# Patient Record
Sex: MALE | Race: WHITE | NOT HISPANIC OR LATINO | Employment: OTHER | URBAN - METROPOLITAN AREA
[De-identification: names, ages, dates, MRNs, and addresses within clinical notes are randomized per-mention and may not be internally consistent; named-entity substitution may affect disease eponyms.]

---

## 2017-01-20 ENCOUNTER — PROCEDURE VISIT (OUTPATIENT)
Dept: CARDIOLOGY | Facility: HOSPITAL | Age: 62
End: 2017-01-20

## 2017-01-20 ENCOUNTER — OFFICE VISIT (OUTPATIENT)
Dept: CARDIOLOGY | Facility: HOSPITAL | Age: 62
End: 2017-01-20

## 2017-01-20 VITALS
BODY MASS INDEX: 25.95 KG/M2 | WEIGHT: 195.8 LBS | TEMPERATURE: 98.9 F | SYSTOLIC BLOOD PRESSURE: 140 MMHG | HEART RATE: 93 BPM | DIASTOLIC BLOOD PRESSURE: 85 MMHG | RESPIRATION RATE: 20 BRPM | HEIGHT: 73 IN | OXYGEN SATURATION: 99 %

## 2017-01-20 DIAGNOSIS — I48.0 PAROXYSMAL ATRIAL FIBRILLATION (HCC): Primary | ICD-10-CM

## 2017-01-20 DIAGNOSIS — I48.0 PAROXYSMAL ATRIAL FIBRILLATION (HCC): ICD-10-CM

## 2017-01-20 DIAGNOSIS — I49.5 TACHY-BRADY SYNDROME (HCC): ICD-10-CM

## 2017-01-20 PROCEDURE — 99214 OFFICE O/P EST MOD 30 MIN: CPT | Performed by: NURSE PRACTITIONER

## 2017-01-20 PROCEDURE — 93010 ELECTROCARDIOGRAM REPORT: CPT | Performed by: INTERNAL MEDICINE

## 2017-01-20 PROCEDURE — 93005 ELECTROCARDIOGRAM TRACING: CPT

## 2017-01-20 NOTE — MR AVS SNAPSHOT
Trevor Mendez   1/20/2017 10:00 AM   Office Visit    Dept Phone:  [phone]   Encounter #:  [id number]    Provider:  KATHERINE Licona   Department:  [facility]                Your Full Care Plan              Today's Medication Changes          These changes are accurate as of: 1/20/17 10:06 AM.  If you have any questions, ask your nurse or doctor.               Stop taking medication(s)listed here:     sucralfate 1 G tablet   Commonly known as:  CARAFATE   Stopped by:  KATHERINE Licona                      Your Updated Medication List          This list is accurate as of: 1/20/17 10:06 AM.  Always use your most recent med list.                albuterol 108 (90 BASE) MCG/ACT inhaler   Commonly known as:  PROVENTIL HFA;VENTOLIN HFA       docusate sodium 100 MG capsule   Commonly known as:  COLACE       flecainide 50 MG tablet   Commonly known as:  TAMBOCOR   Take 1 tablet by mouth Every 12 (Twelve) Hours.       gabapentin 300 MG capsule   Commonly known as:  NEURONTIN       iron polysaccharides 150 MG capsule   Commonly known as:  NIFEREX       omeprazole 40 MG capsule   Commonly known as:  priLOSEC       simvastatin 40 MG tablet   Commonly known as:  ZOCOR       tolterodine 2 MG tablet   Commonly known as:  DETROL       vitamin D 88936 UNITS capsule capsule   Commonly known as:  ERGOCALCIFEROL       XARELTO 20 MG tablet   Generic drug:  rivaroxaban               You Were Diagnosed With        Codes Comments    Paroxysmal atrial fibrillation    -  Primary ICD-10-CM: I48.0  ICD-9-CM: 427.31       Instructions     None    Patient Instructions History      Upcoming Appointments     Visit Type Date Time Department    NEW PATIENT 1/20/2017 10:00 AM [facility]    ELECTROCARDIOGRAM 1/20/2017  9:45 AM  [facility]    ELECTROCARDIOGRAM 1/20/2017 10:30 AM  [facility]    FOLLOW UP 2/2/2017  1:00 PM [facility]    FOLLOW UP  "3/3/2017 11:45 AM [facility]      MyChart Signup     Our records indicate that your [facility] imgix account has been deactivated. If you would like to reactivate your account, please email [email or web address] or call [phone] to talk to our [employer] staff.             Other Info from Your Visit           Your Appointments     Jan 20, 2017 10:30 AM EST   Electrocardiogram with [facility] ECG   [facility] ([place])    [address]  [facility] [address]   [phone]            Feb 02, 2017  1:00 PM EST   Follow Up with KATHERINE Caballero   [facility] CARDIOLOGY (--)    [address] [facility] [address] [phone]           Arrive 15 minutes prior to appointment.            Mar 03, 2017 11:45 AM EST   Follow Up with Tunde Rees DO   [facility] CARDIOLOGY (--)    [address] [facility] [address]   [phone]           Arrive 15 minutes prior to appointment.              Allergies     No Known Allergies      Reason for Visit     Establish Care           Vital Signs     Blood Pressure Pulse Temperature Respirations Height Weight    140/85 (BP Location: Left arm, Patient Position: Standing) 93 98.9 °F (37.2 °C) (Temporal Artery ) 20 73\" (185.4 cm) 195 lb 12.8 oz (88.8 kg)    Oxygen Saturation Body Mass Index Smoking Status             99% 25.83 kg/m2 Former Smoker         Problems and Diagnoses Noted     Atrial fibrillation (irregular heartbeat)        "

## 2017-01-20 NOTE — PROGRESS NOTES
[facility]  Heart and Valve Center      Encounter Date:01/20/2017     Trevor Mendez  [address] [address]  [phone]    1955    Ovidio Kay DO    Trevor Mendez is a 61 y.o. male.      Subjective:     Chief Complaint:  Atrial Fibrillation (s/p PVA)       HPI     Patient with a history of symptomatic paroxysmal atrial fibrillation with limitation medical management secondary to tachybradycardia syndrome and pauses.  Had a pulmonary vein ablation on 12/21/2016.  He was started on flecainide 50 mg twice a day.  Patient is also on Xarelto 20 mg daily.  Patient reports one episode of chest discomfort lasting approximately 2 hours.  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  This was approximately 2 weeks ago.  Patient denies dysphagia, epigastric pain or discomfort, abdominal pain, dysuria, hematuria, fever, chills, nausea, vomiting, diarrhea, dyspnea, feeling palpitations (could not tell when he is in atrial fibrillation prior to PVA), dizziness, syncope, edema, orthopnea.    Patient Active Problem List    Diagnosis   • Paroxysmal atrial fibrillation [I48.0]   • Obstructive sleep apnea [G47.33]   • PAF (paroxysmal atrial fibrillation) [I48.0]     Overview Note:     A). Diagnosed May 2015 at [facility].  Converted to sinus while on cardizem drip. Started on metoprolol.  B). Seen by Dr. Farrar Feb 2016.  Started on ASA  C). GXT 2/29/16: exercised 5 min, 93% THR, 128/78, no ischemia by EKG criteria, few PVCs.  D). Echo 2/29/16: EF 55-60%, mild-mod MR & AI, mild conc LVH, diastolic dysfunction, mild pulm HTN, RVSP 30-35.  E). Aug 2016: 14 day event monitor with 10 episodes of AFib / flutter. Xarelto started.  F). Seen by Dr. Rees 10/7/16: Metoprolol stopped for bradycardia in 40s, fatigue & weakness  G). 48hr Holter 10/31/16: Runs of atrial flutter w/RVR up to 194, sinus  rajiv down to 43, PACs & PVCs.  Planned for PVA.   H). Normal TSH (1.87 on 7/9/16)  I). CHADS-VASc = 0     • Tachy-rajiv syndrome [I49.5]         Past Surgical History   Procedure Laterality Date   • Prostatectomy     • Converted (historical) holter  07/13/2016     Multiple PVCs 1.2%, baseline NSR rate of 66, rare PAC, max    • Cardiovascular stress test  02/29/2016     5 min, 93% THR, 128/78, no ischemia   • Echo - converted  02/29/2016     EF 50-55%, mild to mod AR, mild to mod MR   • Other surgical history  02/26/2016     CT head:  no old infarct seen   • Cardiac electrophysiology procedure N/A 12/20/2016     Procedure: PVA and AFL RFA;  Surgeon: Tunde Rees DO;  Location: [facility] INVASIVE LOCATION;  Service:        No Known Allergies      Current Outpatient Prescriptions:   •  albuterol (PROVENTIL HFA;VENTOLIN HFA) 108 (90 BASE) MCG/ACT inhaler, Inhale 2 puffs Every 4 (Four) Hours As Needed for wheezing., Disp: , Rfl:   •  docusate sodium (COLACE) 100 MG capsule, Take 100 mg by mouth Daily., Disp: , Rfl:   •  flecainide (TAMBOCOR) 50 MG tablet, Take 1 tablet by mouth Every 12 (Twelve) Hours., Disp: 60 tablet, Rfl: 1  •  gabapentin (NEURONTIN) 300 MG capsule, Take 300 mg by mouth 3 (three) times a day., Disp: , Rfl:   •  iron polysaccharides (NIFEREX) 150 MG capsule, Take 150 mg by mouth 2 (two) times a day., Disp: , Rfl:   •  omeprazole (PriLOSEC) 40 MG capsule, Take 40 mg by mouth daily., Disp: , Rfl:   •  simvastatin (ZOCOR) 40 MG tablet, Take 40 mg by mouth every night., Disp: , Rfl:   •  tolterodine (DETROL) 2 MG tablet, Take 2 mg by mouth 2 (Two) Times a Day., Disp: , Rfl:   •  vitamin D (ERGOCALCIFEROL) 16508 UNITS capsule capsule, Take 50,000 Units by mouth 1 (one) time per week., Disp: , Rfl:   •  XARELTO 20 MG tablet, Take 20 mg by mouth Daily., Disp: , Rfl: 6    The following portions of the patient's history were reviewed and updated as appropriate: allergies, current medications, past  family history, past medical history, past social history, past surgical history and problem list.    Review of Systems   Constitution: Positive for weakness. Negative for chills, decreased appetite, diaphoresis, fever, malaise/fatigue, night sweats, weight gain and weight loss.   HENT: Negative for congestion, headaches and nosebleeds.    Eyes: Positive for blurred vision and visual disturbance. Negative for visual halos.   Cardiovascular: Positive for chest pain. Negative for claudication, cyanosis, dyspnea on exertion, irregular heartbeat, leg swelling, near-syncope, orthopnea, palpitations, paroxysmal nocturnal dyspnea and syncope.   Respiratory: Positive for snoring. Negative for cough, hemoptysis, shortness of breath, sleep disturbances due to breathing, sputum production and wheezing.    Endocrine: Negative for cold intolerance, heat intolerance, polydipsia, polyphagia and polyuria.   Hematologic/Lymphatic: Bruises/bleeds easily.   Skin: Negative for dry skin, itching and rash.   Musculoskeletal: Positive for arthritis, back pain and joint pain. Negative for falls, joint swelling, muscle weakness and myalgias.   Gastrointestinal: Positive for flatus. Negative for bloating, abdominal pain, constipation, diarrhea, dysphagia, heartburn, melena, nausea and vomiting.   Genitourinary: Positive for frequency and nocturia. Negative for dysuria, flank pain and hematuria.   Neurological: Positive for difficulty with concentration and excessive daytime sleepiness. Negative for dizziness, light-headedness and loss of balance.   Psychiatric/Behavioral: Negative for altered mental status and depression. The patient has insomnia and is nervous/anxious.    Allergic/Immunologic: Negative for environmental allergies.       Objective:     Vitals:    01/20/17 0916 01/20/17 0918 01/20/17 0919   BP: 137/80 138/79 140/85   BP Location: Right arm Left arm Left arm   Patient Position: Sitting Sitting Standing   Pulse: 85  93   Resp:  "20     Temp: 98.9 °F (37.2 °C)     TempSrc: Temporal Artery      SpO2: 99%     Weight: 195 lb 12.8 oz (88.8 kg)     Height: 73\" (185.4 cm)           Physical Exam   Constitutional: He is oriented to person, place, and time. He appears well-developed and well-nourished. No distress.   HENT:   Head: Normocephalic and atraumatic.   Mouth/Throat: Oropharynx is clear and moist.   Eyes: Conjunctivae are normal. Pupils are equal, round, and reactive to light. No scleral icterus.   Neck: No hepatojugular reflux and no JVD present. Carotid bruit is not present. No tracheal deviation present. No thyromegaly present.   Cardiovascular: Normal rate, regular rhythm, normal heart sounds and intact distal pulses.  Exam reveals no friction rub.    No murmur heard.  Pulmonary/Chest: Effort normal and breath sounds normal.   Abdominal: Soft. Bowel sounds are normal. He exhibits no distension. There is no tenderness.   Musculoskeletal: He exhibits no edema.   Lymphadenopathy:     He has no cervical adenopathy.   Neurological: He is alert and oriented to person, place, and time.   Skin: Skin is warm, dry and intact. No rash noted. No cyanosis or erythema. No pallor.   Psychiatric: He has a normal mood and affect. His behavior is normal. Thought content normal.   Vitals reviewed.      Lab and Diagnostic Review:  Admission on 12/20/2016, Discharged on 12/21/2016   Component Date Value Ref Range Status   • BSA 12/20/2016 2.1  m^2 Preliminary   • BH CV ECHO WING - BZI_BMI 12/20/2016 25.2  kilograms/m^2 Preliminary   • BH CV ECHO WING - BSA([place]) 12/20/2016 2.1  m^2 Preliminary   • BH CV ECHO WING - BZI_METRIC_WEIGHT 12/20/2016 86.6  kg Preliminary   •  CV ECHO WING - BZI_METRIC_HEIGHT 12/20/2016 185.4  cm Preliminary   • Ao root annulus 12/20/2016 3.00  cm Final   • STJ 12/20/2016 2.70  cm Final   • Ascending aorta 12/20/2016 2.80  cm Final   • Echo EF Estimated 12/20/2016 55  % Final   • LVPWd 12/20/2016 1.1  cm Final   • IVSd " 12/20/2016 1.1  cm Final   • LA dimension 12/20/2016 4.8  cm Final   • LVIDd 12/20/2016 4.8  cm Final   • LVIDs 12/20/2016 3.6  cm Final   • Hemoglobin A1C 12/19/2016 5.90* 4.80 - 5.60 % Final     Magnesium 1.3 - 2.7 mg/dL 2.1     Glucose 70 - 100 mg/dL 103 (H)   BUN 9 - 23 mg/dL 23   Creatinine 0.60 - 1.30 mg/dL 1.10   Sodium 132 - 146 mmol/L 141   Potassium 3.5 - 5.5 mmol/L 3.9   Chloride 99 - 109 mmol/L 104   CO2 20.0 - 31.0 mmol/L 27.0   Calcium 8.7 - 10.4 mg/dL 9.6   Total Protein 5.7 - 8.2 g/dL 7.8   Albumin 3.20 - 4.80 g/dL 4.50   ALT (SGPT) 7 - 40 U/L 25   AST (SGOT) 0 - 33 U/L 19     WBC 3.50 - 10.80 10*3/mm3 6.80   RBC 4.20 - 5.76 10*6/mm3 5.14   Hemoglobin 13.1 - 17.5 g/dL 14.9   Hematocrit 38.9 - 50.9 % 44.9   MCV 80.0 - 99.0 fL 87.4   MCH 27.0 - 31.0 pg 29.0   MCHC 32.0 - 36.0 g/dL 33.2   RDW 11.3 - 14.5 % 14.2   RDW-SD 37.0 - 54.0 fl 45.5   MPV 6.0 - 12.0 fL 10.1   Platelets 150 - 450 10*3/mm3 299       Assessment and Plan:         1. Paroxysmal atrial fibrillation  S/p PVA  - ECG 12 Lead; normal sinus rhythm 87 bpm    Patient reports one episode of chest discomfort that lasted approximately 2 hours.  Could be r/t atrial fibrillation recurrence.  Patient was unable to tell when he was in atrial fibrillation prior to PVA.  Will continue flecainide until follow-up with Dr. Rees.  Continue Xarelto as scheduled    2. Tachy-rajiv syndrome  No dizziness or syncope noted.  HR and BP controlled today.    A. fib education completed: What is atrial fibrillation, causes, triggers, signs and symptoms, medication management (rate control versus rhythm control) and stroke prevention, procedural management and indications, and the role of the atrial fibrillation center and when to call.Discussed atrial fibrillation management post pulmonary vein ablation.  Discussed the role the heart and valve Center and when to call.  Patient will follow with Dr. Rees to call the heart and valve Center with any questions or  concerns. (education time 20 minutes)      *Please note that portions of this note were completed with a voice recognition program. Efforts were made to edit the dictations, but occasionally words are mistranscribed.

## 2017-02-01 RX ORDER — FLECAINIDE ACETATE 50 MG/1
50 TABLET ORAL EVERY 12 HOURS SCHEDULED
Qty: 60 TABLET | Refills: 5 | Status: SHIPPED | OUTPATIENT
Start: 2017-02-01 | End: 2017-02-08 | Stop reason: SDUPTHER

## 2017-02-08 ENCOUNTER — OFFICE VISIT (OUTPATIENT)
Dept: CARDIOLOGY | Facility: CLINIC | Age: 62
End: 2017-02-08

## 2017-02-08 VITALS
WEIGHT: 194 LBS | HEART RATE: 149 BPM | HEIGHT: 73 IN | DIASTOLIC BLOOD PRESSURE: 60 MMHG | BODY MASS INDEX: 25.71 KG/M2 | SYSTOLIC BLOOD PRESSURE: 124 MMHG

## 2017-02-08 DIAGNOSIS — I49.5 TACHY-BRADY SYNDROME (HCC): ICD-10-CM

## 2017-02-08 DIAGNOSIS — Z78.9 EXCESSIVE CAFFEINE INTAKE: ICD-10-CM

## 2017-02-08 DIAGNOSIS — I47.1 SVT (SUPRAVENTRICULAR TACHYCARDIA) (HCC): Primary | ICD-10-CM

## 2017-02-08 DIAGNOSIS — Z79.01 CURRENT USE OF LONG TERM ANTICOAGULATION: ICD-10-CM

## 2017-02-08 DIAGNOSIS — I48.0 PAF (PAROXYSMAL ATRIAL FIBRILLATION) (HCC): ICD-10-CM

## 2017-02-08 DIAGNOSIS — Z79.899 LONG TERM CURRENT USE OF ANTIARRHYTHMIC MEDICAL THERAPY: ICD-10-CM

## 2017-02-08 DIAGNOSIS — G47.33 OBSTRUCTIVE SLEEP APNEA: ICD-10-CM

## 2017-02-08 PROBLEM — I47.10 SVT (SUPRAVENTRICULAR TACHYCARDIA): Status: ACTIVE | Noted: 2017-02-08

## 2017-02-08 PROCEDURE — 99214 OFFICE O/P EST MOD 30 MIN: CPT | Performed by: NURSE PRACTITIONER

## 2017-02-08 PROCEDURE — 93000 ELECTROCARDIOGRAM COMPLETE: CPT | Performed by: NURSE PRACTITIONER

## 2017-02-08 RX ORDER — FLECAINIDE ACETATE 50 MG/1
50 TABLET ORAL EVERY 12 HOURS SCHEDULED
Qty: 60 TABLET | Refills: 5 | Status: SHIPPED | OUTPATIENT
Start: 2017-02-08 | End: 2017-03-10

## 2017-02-08 NOTE — PROGRESS NOTES
Chief Complaint   Patient presents with   • Follow-up     Dr Rees did an ablation 12/20/16. Followed up with them 1/20/17.  To see Dr Rees on 3/3/17, has had a few episodes of feeling like it was racing with some SOB.    • Med Refill     Refills needed on flecainide.  90 days to [facility].       [place]       Trevor Mendez is a 61 y.o. male  with a history of sleep apnea, hypercholesteremia who in May of this year went into atrial fibrillation with RVR. He went to the hospital and was converted to NSR most likely with cardizem therapy. He was later referred here. Regular stress test done at consult showed no ischemic burden with slightly diminished exercise tolerance. Echo showed normal LV function. Holter was placed in July secondary to tachycardia and then pre-op evaluation showed a low heart rate of 43. Holter showed no PAF, no SVT, he did have some PVCs, patient on metoprolol therapy. At his last office visit cardiac monitor was ordered and showed PAF. Xarelto was started and referral made to Dr. Rees. In December 2016, he underwent PV ablation.  Today he returns to the office for a follow-up appointment and denies chest pain or palpitations.  He does admit to some mild increase in shortness of breath.  He is maintaining his normal activities without problem.    HPI         Cardiac History:    Past Surgical History   Procedure Laterality Date   • Prostatectomy     • Converted (historical) holter  07/13/2016     Multiple PVCs 1.2%, baseline NSR rate of 66, rare PAC, max    • Cardiovascular stress test  02/29/2016     5 min, 93% THR, 128/78, no ischemia   • Echo - converted  02/29/2016     EF 50-55%, mild to mod AR, mild to mod MR   • Other surgical history  02/26/2016     CT head:  no old infarct seen   • Cardiac electrophysiology procedure N/A 12/20/2016     Procedure: PVA and AFL RFA;  Surgeon: Tunde Rees DO;  Location: [facility] INVASIVE LOCATION;  Service:    • Other surgical  history  09/06/2016     Event monitor- PAF, Xarelto added and referral made to Dr. Rees       Current Outpatient Prescriptions   Medication Sig Dispense Refill   • albuterol (PROVENTIL HFA;VENTOLIN HFA) 108 (90 BASE) MCG/ACT inhaler Inhale 2 puffs Every 4 (Four) Hours As Needed for wheezing.     • docusate sodium (COLACE) 100 MG capsule Take 100 mg by mouth Daily.     • flecainide (TAMBOCOR) 50 MG tablet Take 1 tablet by mouth Every 12 (Twelve) Hours for 30 days. 60 tablet 5   • gabapentin (NEURONTIN) 300 MG capsule Take 300 mg by mouth 3 (three) times a day.     • iron polysaccharides (NIFEREX) 150 MG capsule Take 150 mg by mouth 2 (two) times a day.     • omeprazole (PriLOSEC) 40 MG capsule Take 40 mg by mouth daily.     • simvastatin (ZOCOR) 40 MG tablet Take 40 mg by mouth every night.     • tolterodine (DETROL) 2 MG tablet Take 2 mg by mouth 2 (Two) Times a Day.     • vitamin D (ERGOCALCIFEROL) 72557 UNITS capsule capsule Take 50,000 Units by mouth 1 (one) time per week.     • XARELTO 20 MG tablet Take 20 mg by mouth Daily.  6   • metoprolol tartrate (LOPRESSOR) 25 MG tablet Take 1 tablet by mouth 2 (Two) Times a Day. 60 tablet 11     No current facility-administered medications for this visit.        Review of patient's allergies indicates no known allergies.    Past Medical History   Diagnosis Date   • A-fib      CHADS-VASc = 0   • Anxiety and depression    • Arthritis    • GERD (gastroesophageal reflux disease)    • History of prostate surgery    • Hyperlipidemia    • Sleep apnea      CPAP   • Vitamin D deficiency        Social History     Social History   • Marital status:      Spouse name: N/A   • Number of children: 2   • Years of education: N/A     Occupational History   • Not on file.     Social History Main Topics   • Smoking status: Former Smoker     Packs/day: 3.00     Years: 20.00     Quit date: 1990   • Smokeless tobacco: Never Used   • Alcohol use Yes      Comment: Occ. Beer   • Drug  "use: No   • Sexual activity: Defer     Other Topics Concern   • Not on file     Social History Narrative    Patient consumes 1 pot coffee daily, Occasional soda, tea .     Patient lives at home with girlfriend.            Family History   Problem Relation Age of Onset   • Hypertension Mother    • Heart attack Father      passed away age 69   • Heart attack Maternal Grandfather    • Heart attack Paternal Grandmother        Review of Systems   Constitutional: Negative.    HENT: Negative.    Eyes: Negative.    Respiratory: Positive for shortness of breath. Negative for cough, choking and wheezing.    Cardiovascular: Negative.    Gastrointestinal: Negative.    Endocrine: Negative.    Genitourinary: Negative.    Skin: Negative.    Neurological: Negative.    Hematological: Bruises/bleeds easily.   Psychiatric/Behavioral: Negative.        Diabetes- No  Thyroid-normal    Objective     Visit Vitals   • /60   • Pulse (!) 149   • Ht 73\" (185.4 cm)   • Wt 194 lb (88 kg)   • BMI 25.6 kg/m2       Physical Exam   Constitutional: He is oriented to person, place, and time. He appears well-developed.   Eyes: Pupils are equal, round, and reactive to light.   Neck: Neck supple. No JVD present. Carotid bruit is not present.   Cardiovascular: Tachycardia present.    Pulses:       Radial pulses are 2+ on the right side, and 2+ on the left side.   Pulmonary/Chest: Effort normal. No respiratory distress. He has wheezes (very slighty expiratory wheeze noted).   Abdominal: Soft. Bowel sounds are normal. He exhibits no distension. There is no tenderness.   Musculoskeletal: He exhibits no edema.   Neurological: He is alert and oriented to person, place, and time. He has normal strength.   Skin: Skin is warm and dry. No pallor.   Psychiatric: He has a normal mood and affect. His behavior is normal. Judgment and thought content normal.   Vitals reviewed.      ECG 12 Lead  Date/Time: 2/8/2017 3:41 PM  Performed by: OJ EDGE " R  Authorized by: OJ EDGE   Comparison: compared with previous ECG from 1/20/2017  Comparison to previous ECG: NSR  Rhythm: SVT  BPM: 149                  Assessment/Plan      Trevor was seen today for follow-up and med refill.    Diagnoses and all orders for this visit:    SVT (supraventricular tachycardia)  -     ECG 12 Lead    PAF (paroxysmal atrial fibrillation)  -     ECG 12 Lead    Tachy-rajiv syndrome  -     ECG 12 Lead    Obstructive sleep apnea    Long term current use of antiarrhythmic medical therapy  -     ECG 12 Lead    Current use of long term anticoagulation    Excessive caffeine intake    Other orders  -     metoprolol tartrate (LOPRESSOR) 25 MG tablet; Take 1 tablet by mouth 2 (Two) Times a Day.  -     flecainide (TAMBOCOR) 50 MG tablet; Take 1 tablet by mouth Every 12 (Twelve) Hours for 30 days.        Mr. Mendez appears asymptomatic and admits to only mild increased shortness of breath.  His EKG shows SVT with heart rate of 149 bpm.  He reports taking his medication as prescribed.  After discussion with Dr. Farrar advised adding Lopressor 25 mg twice a day.  He will return to the office Friday morning for repeat EKG.  For any development of significant symptoms or problems he does understand to go to the emergency room.  He continues on Xarelto without signs of bleeding.  It is noted he has at least 48 ounces of coffee in the mornings.  I instructed him to significantly decrease the amount of caffeine intake to no more than 2 normal cups per day and avoiding all together would be better.  He agrees to try to decrease caffeine.  Also encouraged him on adequate hydration.  For management of labs he follows with you.  Further recommendations based on the EKG.           Electronically signed by KATHERINE Griffin,  February 8, 2017 3:42 PM

## 2017-02-10 ENCOUNTER — TELEPHONE (OUTPATIENT)
Dept: CARDIOLOGY | Facility: CLINIC | Age: 62
End: 2017-02-10

## 2017-02-10 ENCOUNTER — CLINICAL SUPPORT (OUTPATIENT)
Dept: CARDIOLOGY | Facility: CLINIC | Age: 62
End: 2017-02-10

## 2017-02-10 DIAGNOSIS — I49.1 PAC (PREMATURE ATRIAL CONTRACTION): ICD-10-CM

## 2017-02-10 DIAGNOSIS — I47.1 PAROXYSMAL SVT (SUPRAVENTRICULAR TACHYCARDIA) (HCC): Primary | ICD-10-CM

## 2017-02-10 DIAGNOSIS — Z79.899 MEDICATION MANAGEMENT: ICD-10-CM

## 2017-02-10 PROCEDURE — 93000 ELECTROCARDIOGRAM COMPLETE: CPT | Performed by: NURSE PRACTITIONER

## 2017-02-10 NOTE — TELEPHONE ENCOUNTER
Please inform Mr. Mendez to decrease Metoprolol to 12.5 mg bid. Monitor blood pressure and heart rate. Thanks.

## 2017-02-10 NOTE — PROGRESS NOTES
Procedure     ECG 12 Lead  Date/Time: 2/10/2017 10:14 AM  Performed by: OJ EDGE  Authorized by: OJ EDGE   Comparison: compared with previous ECG from 2/8/2016  Comparison to previous ECG: svt  Rhythm: sinus rhythm  Ectopy: atrial premature contractions  BPM: 64  Comments: Sinus with bigemy PACs

## 2017-03-03 ENCOUNTER — OFFICE VISIT (OUTPATIENT)
Dept: CARDIOLOGY | Facility: CLINIC | Age: 62
End: 2017-03-03

## 2017-03-03 VITALS
HEIGHT: 73 IN | BODY MASS INDEX: 25.84 KG/M2 | DIASTOLIC BLOOD PRESSURE: 80 MMHG | SYSTOLIC BLOOD PRESSURE: 126 MMHG | HEART RATE: 64 BPM | WEIGHT: 195 LBS

## 2017-03-03 DIAGNOSIS — I48.0 PAF (PAROXYSMAL ATRIAL FIBRILLATION) (HCC): Primary | ICD-10-CM

## 2017-03-03 DIAGNOSIS — I49.5 TACHY-BRADY SYNDROME (HCC): ICD-10-CM

## 2017-03-03 PROCEDURE — 99213 OFFICE O/P EST LOW 20 MIN: CPT | Performed by: INTERNAL MEDICINE

## 2017-03-03 PROCEDURE — 93000 ELECTROCARDIOGRAM COMPLETE: CPT | Performed by: INTERNAL MEDICINE

## 2017-03-03 NOTE — PROGRESS NOTES
Subjective:   Trevor Mendez  1955  [phone]      03/03/2017    [facility] CARDIOLOGY    Ovidio Kay, DO  [address] [place] [address]    REFERRING DOCTOR: Dr PENG Farrar      Patient ID: Trevor Mendez is a 61 y.o. male.    Chief Complaint: No chief complaint on file.      No Known Allergies    Current Outpatient Prescriptions:   •  albuterol (PROVENTIL HFA;VENTOLIN HFA) 108 (90 BASE) MCG/ACT inhaler, Inhale 2 puffs Every 4 (Four) Hours As Needed for wheezing., Disp: , Rfl:   •  docusate sodium (COLACE) 100 MG capsule, Take 100 mg by mouth Daily., Disp: , Rfl:   •  flecainide (TAMBOCOR) 50 MG tablet, Take 1 tablet by mouth Every 12 (Twelve) Hours for 30 days., Disp: 60 tablet, Rfl: 5  •  gabapentin (NEURONTIN) 300 MG capsule, Take 300 mg by mouth 3 (three) times a day., Disp: , Rfl:   •  iron polysaccharides (NIFEREX) 150 MG capsule, Take 150 mg by mouth 2 (two) times a day., Disp: , Rfl:   •  metoprolol tartrate (LOPRESSOR) 25 MG tablet, Take 1 tablet by mouth 2 (Two) Times a Day., Disp: 60 tablet, Rfl: 11  •  omeprazole (PriLOSEC) 40 MG capsule, Take 40 mg by mouth daily., Disp: , Rfl:   •  simvastatin (ZOCOR) 40 MG tablet, Take 40 mg by mouth every night., Disp: , Rfl:   •  tolterodine (DETROL) 2 MG tablet, Take 2 mg by mouth 2 (Two) Times a Day., Disp: , Rfl:   •  vitamin D (ERGOCALCIFEROL) 45415 UNITS capsule capsule, Take 50,000 Units by mouth 1 (one) time per week., Disp: , Rfl:   •  XARELTO 20 MG tablet, Take 20 mg by mouth Daily., Disp: , Rfl: 6    History of Present Illness  Patient is a 61-year-old male here today for follow-up visit for his paroxysmal atrial fibrillation/sick sinus syndrome status post pulm vein ablation on 12/20/2016.  Patient states she does not think he's had any atrial fibrillation since the time of the procedure.  He does notice some bradycardia at times with one time being 40 bpm he did feel a little bit unusual that time however  "has not happened in some time.  Overall he states he's doing well.    No issues with chest pain, shortness of breath, fevers, chills, night sweats, PND, orthopnea or palpitations. No recent ER visits or hospital stays.      The following portions of the patient's history were reviewed and updated as appropriate: allergies, current medications, past family history, past medical history, past social history, past surgical history and problem list.    ROS   14 point ROS negative except as outlined in problem list, HPI and other parts of the note.      ECG 12 Lead  Date/Time: 3/3/2017 1:13 PM  Performed by: TERRIE MUNOZ  Authorized by: TERRIE MUNOZ   Rhythm: sinus rhythm  Comments: QTc ok, QRS 96               Objective:       Vitals:    03/03/17 1220   BP: 126/80   BP Location: Left arm   Patient Position: Sitting   Pulse: 64   Weight: 195 lb (88.5 kg)   Height: 73\" (185.4 cm)       GENERAL: Well-developed, well-nourished patient in no acute distress.  HEENT: Normocephalic, atraumatic, PERRLA. Moist mucous membranes.  NECK: No JVD present at 30°. No carotid bruits auscultated.  LUNGS: Clear to auscultation.  CARDIOVASCULAR: Heart has a regular rate and rhythm. No murmurs, gallops or rubs noted.   ABDOMEN: Soft, nontender. Positive bowel sounds.  MUSCULOSKELETAL: No gross deformities. No clubbing, cyanosis, or lower extremity edema.  SKIN: Pink, warm  Neuro: Nonfocal exam. Gait intact  Ext: No edema or bruising    The patient's old records including ambulatory rhythm recordings (ECGs, Holter/event monitor) were reviewed and discussed.      Lab Review:   Results for orders placed or performed during the hospital encounter of 12/20/16   Hemoglobin A1c   Result Value Ref Range    Hemoglobin A1C 5.90 (H) 4.80 - 5.60 %   POC Activated Clotting Time   Result Value Ref Range    Activated Clotting Time  147 82 - 152 Seconds   POC Activated Clotting Time   Result Value Ref Range    Activated Clotting Time  255 (H) 82 - 152 " Seconds   POC Activated Clotting Time   Result Value Ref Range    Activated Clotting Time  296 (H) 82 - 152 Seconds   POC Activated Clotting Time   Result Value Ref Range    Activated Clotting Time  343 (H) 82 - 152 Seconds   POC Activated Clotting Time   Result Value Ref Range    Activated Clotting Time  363 (H) 82 - 152 Seconds   POC Activated Clotting Time   Result Value Ref Range    Activated Clotting Time  384 (H) 82 - 152 Seconds   Adult Transesophageal Echo   Result Value Ref Range    BSA 2.1 m^2     CV ECHO WING - BZI_BMI 25.2 kilograms/m^2     CV ECHO WING - BSA(HAYCOCK) 2.1 m^2     CV ECHO WING - BZI_METRIC_WEIGHT 86.6 kg     CV ECHO WING - BZI_METRIC_HEIGHT 185.4 cm    Ao root annulus 3.00 cm    STJ 2.70 cm    Ascending aorta 2.80 cm    Echo EF Estimated 55 %    LVPWd 1.1 cm    IVSd 1.1 cm    LA dimension 4.8 cm    LVIDd 4.8 cm    LVIDs 3.6 cm           Diagnosis:   1. PAF (paroxysmal atrial fibrillation)  2. Tachy-rajiv syndrome      Assessment & Plan:   1.  Symptomatic recurrent paroxysmal atrial fibrillation/sick sinus syndrome despite medical therapy status post PVA 12/20/2016.  Patient has not had any recurrences of atrial fibrillation.  We will stop the flecainide today and continue him on metoprolol 12.5 mg twice a day and blood thinners.  At next follow-up visit in 3 months we will set the patient up for 1 month event monitor and if no recurrence of any atrial fibrillation/flutter then at that time would stop the patient's Xarelto.    2.  Tachycardia-bradycardia syndrome.  We will stop the patient's flecainide today and continue on low-dose beta blocker.  I do not think the patient needs a pacemaker at the present time however we'll watch his rhythm closely and also rates and if any issues he will call the office.    3. Follow up in 3 months         CC: PENG Rees DO  03/03/17  1:08 PM      EMR Dragon/Transcription disclaimer:  Much of this encounter note is an  electronic transcription/translation of spoken language to printed text. Electronic translation of spoken language may permit erroneous, or at times, nonsensical words or phrases to be inadvertently transcribed. Although I have reviewed the note for such errors, some may still exist.

## 2017-03-10 RX ORDER — RIVAROXABAN 20 MG/1
TABLET, FILM COATED ORAL
Qty: 30 TABLET | Refills: 5 | Status: SHIPPED | OUTPATIENT
Start: 2017-03-10 | End: 2018-02-02

## 2017-07-07 ENCOUNTER — OFFICE VISIT (OUTPATIENT)
Dept: CARDIOLOGY | Facility: CLINIC | Age: 62
End: 2017-07-07

## 2017-07-07 VITALS
HEART RATE: 70 BPM | SYSTOLIC BLOOD PRESSURE: 118 MMHG | BODY MASS INDEX: 24.92 KG/M2 | WEIGHT: 188 LBS | DIASTOLIC BLOOD PRESSURE: 80 MMHG | HEIGHT: 73 IN

## 2017-07-07 DIAGNOSIS — I49.5 TACHY-BRADY SYNDROME (HCC): ICD-10-CM

## 2017-07-07 DIAGNOSIS — I48.0 PAROXYSMAL ATRIAL FIBRILLATION (HCC): ICD-10-CM

## 2017-07-07 DIAGNOSIS — I47.1 SVT (SUPRAVENTRICULAR TACHYCARDIA) (HCC): ICD-10-CM

## 2017-07-07 DIAGNOSIS — I48.0 PAF (PAROXYSMAL ATRIAL FIBRILLATION) (HCC): Primary | ICD-10-CM

## 2017-07-07 DIAGNOSIS — I48.0 PAROXYSMAL ATRIAL FIBRILLATION (HCC): Primary | ICD-10-CM

## 2017-07-07 PROCEDURE — 99213 OFFICE O/P EST LOW 20 MIN: CPT | Performed by: INTERNAL MEDICINE

## 2017-07-07 PROCEDURE — 93000 ELECTROCARDIOGRAM COMPLETE: CPT | Performed by: INTERNAL MEDICINE

## 2017-07-07 NOTE — PROGRESS NOTES
Subjective:   Trevor Mendez  1955  [phone]      07/07/2017    [facility] CARDIOLOGY    Ovidio Kay, DO  [address] [place] [address]    REFERRING DOCTOR: Dr. Farrar      Patient ID: Trevor Mendez is a 62 y.o. male resident of [place], [place]    Chief Complaint: Afib  Problem List:  PAF (paroxysmal atrial fibrillation) [I48.0]        Overview Note:       A). Diagnosed May 2015 at [facility]. Converted to sinus while on cardizem drip. Started on metoprolol.  B). Seen by Dr. Farrar Feb 2016. Started on ASA  C). GXT 2/29/16: exercised 5 min, 93% THR, 128/78, no ischemia by EKG criteria, few PVCs.  D). Echo 2/29/16: EF 55-60%, mild-mod MR & AI, mild conc LVH, diastolic dysfunction, mild pulm HTN, RVSP 30-35.  E). Aug 2016: 14 day event monitor with 10 episodes of AFib / flutter. Xarelto started.  F). Seen by Dr. Rees 10/7/16: Metoprolol stopped for bradycardia in 40s, fatigue & weakness  G). 48hr Holter 10/31/16: Runs of atrial flutter w/RVR up to 194, sinus rajiv down to 43, PACs & PVCs. Planned for PVA.   H). Normal TSH (1.87 on 7/9/16)  I). CHADS-VASc = 0  J) s/p PVA 12/2016. On last visit stopped Flecainide.    • Tachy-rajiv syndrome [I49.5]       No Known Allergies    Current Outpatient Prescriptions:   •  albuterol (PROVENTIL HFA;VENTOLIN HFA) 108 (90 BASE) MCG/ACT inhaler, Inhale 2 puffs Every 4 (Four) Hours As Needed for wheezing., Disp: , Rfl:   •  docusate sodium (COLACE) 100 MG capsule, Take 100 mg by mouth Daily., Disp: , Rfl:   •  gabapentin (NEURONTIN) 300 MG capsule, Take 300 mg by mouth 3 (three) times a day., Disp: , Rfl:   •  iron polysaccharides (NIFEREX) 150 MG capsule, Take 150 mg by mouth 2 (two) times a day., Disp: , Rfl:   •  metoprolol tartrate (LOPRESSOR) 25 MG tablet, Take 1 tablet by mouth 2 (Two) Times a Day., Disp: 60 tablet, Rfl: 11  •  omeprazole (PriLOSEC) 40 MG capsule, Take 40 mg by mouth daily., Disp: , Rfl:  "  •  simvastatin (ZOCOR) 40 MG tablet, Take 40 mg by mouth every night., Disp: , Rfl:   •  tolterodine (DETROL) 2 MG tablet, Take 2 mg by mouth 2 (Two) Times a Day., Disp: , Rfl:   •  vitamin D (ERGOCALCIFEROL) 55609 UNITS capsule capsule, Take 50,000 Units by mouth 1 (one) time per week., Disp: , Rfl:   •  XARELTO 20 MG tablet, Take 20 mg by mouth Daily., Disp: , Rfl: 6  •  XARELTO 20 MG tablet, Take 1 tablet by mouth daily for 30 days., Disp: 30 tablet, Rfl: 5    History of Present Illness   Patient here today for Afib s/p PVA and need for follow up. Overall doing well. On last visit stopped Flecainide but doing well. No major issues.   No issues with chest pain, shortness of breath, fevers, chills, night sweats, PND, orthopnea or palpitations. No recent ER visits or hospital stays.      The following portions of the patient's history were reviewed and updated as appropriate: allergies, current medications, past family history, past medical history, past social history, past surgical history and problem list.    ROS   14 point ROS negative except as outlined in problem list, HPI and other parts of the note.      ECG 12 Lead  Date/Time: 7/7/2017 2:47 PM  Performed by: GENNA BANDA  Authorized by: GENNA BANDA   Rhythm: sinus rhythm  Rate: normal  ST Segments: ST segments normal  T Waves: T waves normal  QRS axis: normal  Other: no other findings  Clinical impression: normal ECG        HR 70 bpm       Objective:       Vitals:    07/07/17 1415   BP: 118/80   BP Location: Left arm   Patient Position: Sitting   Pulse: 70   Weight: 188 lb (85.3 kg)   Height: 73\" (185.4 cm)       GENERAL: Well-developed, well-nourished patient in no acute distress.  HEENT: Normocephalic, atraumatic, PERRLA. Moist mucous membranes.  NECK: No JVD present at 30°. No carotid bruits auscultated.  LUNGS: Clear to auscultation.  CARDIOVASCULAR: Heart has a regular rate and rhythm. No murmurs, gallops or rubs noted.   ABDOMEN: Soft, " nontender. Positive bowel sounds.  MUSCULOSKELETAL: No gross deformities. No clubbing, cyanosis, or lower extremity edema.  SKIN: Pink, warm  Neuro: Nonfocal exam. Gait intact  Ext: No edema or bruising    The patient's old records including ambulatory rhythm recordings (ECGs, Holter/event monitor) were reviewed and discussed.                Diagnosis:      Diagnosis Plan   1. PAF (paroxysmal atrial fibrillation)  The patient denies any recurrent episodes of Afib off Tambocor.  He states he has been doing well since his Pva which was in Dec 2016.   2. Paroxysmal atrial fibrillation     3. SVT (supraventricular tachycardia)     4. Tachy-rajiv syndrome          Plan:   1. I would order a 30 day event monitor and if no recurrences of Afib then will stop the Xarelto and switch over to ASA.  Continue Metoprolol for now. CHADS VASC 0 at most 1 with questionable HTN  2. HTN stable but never truly diagnosed  3. Follow up in 6 mths.              JEFRY Meeks  07/07/17  2:47 PM     Acting as a scribe for Tunde Rees    I, Tunde Rees DO, personally performed the services described in this documentation as scribed by the above named individual in my presence, and it is both accurate and complete.  7/7/2017  2:54 PM    Tunde Rees DO  2:54 PM  07/07/17

## 2017-08-10 ENCOUNTER — OFFICE VISIT (OUTPATIENT)
Dept: CARDIOLOGY | Facility: CLINIC | Age: 62
End: 2017-08-10

## 2017-08-10 VITALS
SYSTOLIC BLOOD PRESSURE: 104 MMHG | BODY MASS INDEX: 23.86 KG/M2 | HEIGHT: 73 IN | WEIGHT: 180 LBS | DIASTOLIC BLOOD PRESSURE: 68 MMHG | HEART RATE: 72 BPM

## 2017-08-10 DIAGNOSIS — I49.5 TACHY-BRADY SYNDROME (HCC): ICD-10-CM

## 2017-08-10 DIAGNOSIS — I48.0 PAROXYSMAL ATRIAL FIBRILLATION (HCC): Primary | ICD-10-CM

## 2017-08-10 DIAGNOSIS — G47.33 OBSTRUCTIVE SLEEP APNEA: ICD-10-CM

## 2017-08-10 DIAGNOSIS — Z79.01 CURRENT USE OF LONG TERM ANTICOAGULATION: ICD-10-CM

## 2017-08-10 PROCEDURE — 99213 OFFICE O/P EST LOW 20 MIN: CPT | Performed by: NURSE PRACTITIONER

## 2017-08-10 PROCEDURE — 93000 ELECTROCARDIOGRAM COMPLETE: CPT | Performed by: NURSE PRACTITIONER

## 2017-08-10 NOTE — PROGRESS NOTES
Chief Complaint   Patient presents with   • Follow-up     Follows with Dr. Rees for management of Afib. Is currently wearing a 30 day event monitor for Dr. Rees. Denies chest pain, palpitations, or shortness of breath. Labs per PCP about 3-4 weeks ago. PCP refills meds.        Cardiac Complaints  none      Subjective   Trevor Mendez is a 62 y.o. male with a history of sleep apnea, hypercholesteremia who in May of 2016 went into atrial fibrillation with RVR. He went to the hospital and was converted to NSR most likely with cardizem therapy. He was later referred here. Regular stress test done at consult showed no ischemic burden with slightly diminished exercise tolerance. Echo showed normal LV function. Holter was placed in July of 2016 secondary to tachycardia and then pre-op evaluation showed a low heart rate of 43. Holter showed no PAF, no SVT, he did have some PVCs, patient on metoprolol therapy. Cardiac monitor was ordered in 2016 at an office visit for palpitations and runs of PAF were seen. Xarelto was started and referral was made to Dr. Rees. Then in  December 2016, he underwent PV ablation. At follow up appointment with Dr. Rees in March flecainide therapy was stopped.  At most recent follow up with him in July, 30 day event monitor was advised to look for any re-occurences of atrial fibrillation, if no runs, xarelto may be switched to aspirin, metoprolol was continued.  He returns today for follow up and states he has been doing very well.  He denies any chest pain, palpitations, or shortness of breath.  Labs and refills he reports with PCP.  Most recent were done about 3 months ago, no copies available to me.        Cardiac History  Past Surgical History:   Procedure Laterality Date   • CARDIAC ELECTROPHYSIOLOGY PROCEDURE N/A 12/20/2016    Procedure: PVA and AFL RFA;  Surgeon: Tunde Rees DO;  Location: [facility] INVASIVE LOCATION;  Service:    • CARDIOVASCULAR STRESS TEST  02/29/2016    5 min,  93% THR, 128/78, no ischemia   • CONVERTED (HISTORICAL) HOLTER  07/13/2016    Multiple PVCs 1.2%, baseline NSR rate of 66, rare PAC, max    • ECHO - CONVERTED  02/29/2016    EF 50-55%, mild to mod AR, mild to mod MR   • OTHER SURGICAL HISTORY  02/26/2016    CT head:  no old infarct seen   • OTHER SURGICAL HISTORY  09/06/2016    Event monitor- PAF, Xarelto added and referral made to Dr. Rees   • PROSTATECTOMY         Current Outpatient Prescriptions   Medication Sig Dispense Refill   • albuterol (PROVENTIL HFA;VENTOLIN HFA) 108 (90 BASE) MCG/ACT inhaler Inhale 2 puffs Every 4 (Four) Hours As Needed for wheezing.     • docusate sodium (COLACE) 100 MG capsule Take 100 mg by mouth Daily.     • gabapentin (NEURONTIN) 300 MG capsule Take 300 mg by mouth 3 (three) times a day.     • iron polysaccharides (NIFEREX) 150 MG capsule Take 150 mg by mouth 2 (two) times a day.     • metoprolol tartrate (LOPRESSOR) 25 MG tablet Take 1 tablet by mouth 2 (Two) Times a Day. 60 tablet 11   • omeprazole (PriLOSEC) 40 MG capsule Take 40 mg by mouth daily.     • simvastatin (ZOCOR) 40 MG tablet Take 40 mg by mouth every night.     • tolterodine (DETROL) 2 MG tablet Take 2 mg by mouth 2 (Two) Times a Day.     • vitamin D (ERGOCALCIFEROL) 17003 UNITS capsule capsule Take 50,000 Units by mouth 1 (one) time per week.     • XARELTO 20 MG tablet Take 1 tablet by mouth daily for 30 days. 30 tablet 5     No current facility-administered medications for this visit.        Review of patient's allergies indicates no known allergies.    Past Medical History:   Diagnosis Date   • A-fib     CHADS-VASc = 0   • Anxiety and depression    • Arthritis    • GERD (gastroesophageal reflux disease)    • History of prostate surgery    • Hyperlipidemia    • Sleep apnea     CPAP   • Vitamin D deficiency        Social History     Social History   • Marital status:      Spouse name: N/A   • Number of children: 2   • Years of education: N/A  "    Occupational History   • Not on file.     Social History Main Topics   • Smoking status: Former Smoker     Packs/day: 3.00     Years: 20.00     Quit date: 1990   • Smokeless tobacco: Never Used   • Alcohol use Yes      Comment: Occ. Beer   • Drug use: No   • Sexual activity: Defer     Other Topics Concern   • Not on file     Social History Narrative    Patient consumes 1 pot coffee daily, Occasional soda, tea .     Patient lives at home with girlfriend.            Family History   Problem Relation Age of Onset   • Hypertension Mother    • Heart attack Father      passed away age 69   • Heart attack Maternal Grandfather    • Heart attack Paternal Grandmother        Review of Systems   Constitution: Negative for weakness and malaise/fatigue.   Cardiovascular: Negative for chest pain, dyspnea on exertion, leg swelling and palpitations.   Respiratory: Negative for shortness of breath and wheezing.    Gastrointestinal: Negative for anorexia, heartburn and nausea.   Neurological: Negative for dizziness, focal weakness and light-headedness.   Psychiatric/Behavioral: Negative for altered mental status and depression.       DiabetesNo  Thyroidnormal    Objective     /68  Pulse 72  Ht 73\" (185.4 cm)  Wt 180 lb (81.6 kg)  BMI 23.75 kg/m2    Physical Exam   Constitutional: He is oriented to person, place, and time. He appears well-developed and well-nourished.   HENT:   Head: Normocephalic and atraumatic.   Eyes: EOM are normal. Pupils are equal, round, and reactive to light.   Neck: Normal range of motion. Neck supple.   Cardiovascular: Normal rate and regular rhythm.    Murmur heard.  Pulmonary/Chest: Effort normal and breath sounds normal.   Abdominal: Soft.   Musculoskeletal: Normal range of motion.   Neurological: He is alert and oriented to person, place, and time.   Skin: Skin is warm and dry.   Psychiatric: He has a normal mood and affect. His behavior is normal.         ECG 12 Lead  Date/Time: 8/10/2017 " 2:15 PM  Performed by: LAILA RAMIREZ  Authorized by: LAILA RAMIREZ   Rhythm: sinus rhythm  BPM: 72  Clinical impression: non-specific ECG            Assessment/Plan     HR and BP are stable.  EKG done today for PAF and xarelto therapy and s/p PVA shows a NSR with normal QT.  No changes to current regimen will be advised.  Labs are done with you as well as refills.  No current copy available to me, could we get next copy?  BMI is stable today at 23, he states he remains busy at home without concerns.   We will not advise on any further cardiac workup as he is doing well and denies cardiac complaints.  He continues to follow with Dr. Rees for PAF and is currently wearing an event recorder for him.  If no PAF found on the monitor, xarelto therapy will be stopped in favor of aspirin therapy,according to Cabrera's recent follow up note.  6 month follow up will be advised or sooner if needed.  Limited caffeine intake advised.      Problems Addressed this Visit        Cardiovascular and Mediastinum    Tachy-rajiv syndrome    Paroxysmal atrial fibrillation - Primary    Relevant Orders    ECG 12 Lead       Respiratory    Obstructive sleep apnea       Other    Current use of long term anticoagulation              Electronically signed by Laila Ramirez, KATHERINE August 10, 2017 5:01 PM

## 2017-09-05 ENCOUNTER — TELEPHONE (OUTPATIENT)
Dept: CARDIOLOGY | Facility: CLINIC | Age: 62
End: 2017-09-05

## 2018-02-02 ENCOUNTER — OFFICE VISIT (OUTPATIENT)
Dept: CARDIOLOGY | Facility: CLINIC | Age: 63
End: 2018-02-02

## 2018-02-02 VITALS
HEIGHT: 72 IN | SYSTOLIC BLOOD PRESSURE: 114 MMHG | WEIGHT: 170 LBS | BODY MASS INDEX: 23.03 KG/M2 | HEART RATE: 59 BPM | DIASTOLIC BLOOD PRESSURE: 64 MMHG

## 2018-02-02 DIAGNOSIS — I48.0 PAF (PAROXYSMAL ATRIAL FIBRILLATION) (HCC): Primary | ICD-10-CM

## 2018-02-02 PROCEDURE — 99213 OFFICE O/P EST LOW 20 MIN: CPT | Performed by: INTERNAL MEDICINE

## 2018-02-02 NOTE — PROGRESS NOTES
Trevor JOHNY Mendez  1955  [phone]      02/02/2018    [facility] CARDIOLOGY     Ovidio Kay, DO  [address] [place] [address]    Chief Complaint   Patient presents with   • Atrial Fibrillation       Problem List:   PAF (paroxysmal atrial fibrillation) [I48.0]           Overview Note:         A). Diagnosed May 2015 at [facility]. Converted to sinus while on cardizem drip. Started on metoprolol.  B). Seen by Dr. Farrar Feb 2016. Started on ASA  C). GXT 2/29/16: exercised 5 min, 93% THR, 128/78, no ischemia by EKG criteria, few PVCs.  D). Echo 2/29/16: EF 55-60%, mild-mod MR & AI, mild conc LVH, diastolic dysfunction, mild pulm HTN, RVSP 30-35.  E). Aug 2016: 14 day event monitor with 10 episodes of AFib / flutter. Xarelto started.  F). Seen by Dr. Rees 10/7/16: Metoprolol stopped for bradycardia in 40s, fatigue & weakness  G). 48hr Holter 10/31/16: Runs of atrial flutter w/RVR up to 194, sinus rajiv down to 43, PACs & PVCs. Planned for PVA.   H). Normal TSH (1.87 on 7/9/16)  I). CHADS-VASc = 0  J) s/p PVA 12/2016. On last visit stopped Flecainide.   K) 30 day event monitor 7/2017: no atrial fibrillation and Xarelto stopped per Dr. Rees   • Tachy-rajiv syndrome [I49.5]         Allergies  No Known Allergies    Current Medications    Current Outpatient Prescriptions:   •  albuterol (PROVENTIL HFA;VENTOLIN HFA) 108 (90 BASE) MCG/ACT inhaler, Inhale 2 puffs Every 4 (Four) Hours As Needed for wheezing., Disp: , Rfl:   •  aspirin 81 MG tablet, Take 81 mg by mouth Daily., Disp: , Rfl:   •  docusate sodium (COLACE) 100 MG capsule, Take 100 mg by mouth Daily., Disp: , Rfl:   •  iron polysaccharides (NIFEREX) 150 MG capsule, Take 150 mg by mouth 2 (two) times a day., Disp: , Rfl:   •  metoprolol tartrate (LOPRESSOR) 25 MG tablet, Take 1 tablet by mouth 2 (Two) Times a Day., Disp: 60 tablet, Rfl: 11  •  omeprazole (PriLOSEC) 40 MG capsule, Take 40 mg by mouth  "daily., Disp: , Rfl:   •  simvastatin (ZOCOR) 40 MG tablet, Take 40 mg by mouth every night., Disp: , Rfl:   •  tolterodine (DETROL) 2 MG tablet, Take 2 mg by mouth 2 (Two) Times a Day., Disp: , Rfl:   •  vitamin D (ERGOCALCIFEROL) 48244 UNITS capsule capsule, Take 50,000 Units by mouth 1 (one) time per week., Disp: , Rfl:     History of Present Illness   HPI    Pt presents for follow up of atrial fibrillation s/p PVA in 12/2016. Since we last saw the pt, he wore an event monitor that showed no atrial fibrillation and his Xarelto was stopped and just on ASA. Since then, he has been checking his HRs which are consistently in the 60-80s. Pt denies any AF episodes, SOB, CP, LH, and dizziness. Denies any ER visits, bleeding on ASA, or TIA/CVA symptoms. Overall feels well. He had a penile implant placed since we last saw him.     ROS:  General:  Denies fatigue, weight gain or loss  Cardiovascular:  Denies CP, PND, syncope, near syncope, edema or palpitations.  Pulmonary:  Denies HO[place], cough, or wheezing      Vitals:    02/02/18 1138   BP: 114/64   BP Location: Right arm   Patient Position: Sitting   Pulse: 59   Weight: 77.1 kg (170 lb)   Height: 182.9 cm (72\")     Body mass index is 23.06 kg/(m^2).  PE:  General: NAD  Neck: no JVD, no carotid bruits, no TM  Heart RRR, NL S1, S2, S4 present, no rubs, murmurs  Lungs: CTA, no wheezes, rhonchi, or rales  Abd: soft, non-tender, NL BS  Ext: No musculoskeletal deformities, no edema, cyanosis, or clubbing  Psych: normal mood and affect    Diagnostic Data:      Procedures    1. PAF (paroxysmal atrial fibrillation)          Plan:  1) PAF- no recurrences since ablation 12/2016, doing well.   Continue present medications.   2) Anticoagulation: CHADSVasc = 1  Continue ASA. On 30 day event monitor patient with no atrial fibrillation.  Therefore full blood thinner was stopped and just continue on aspirin.  If he was to have any recurrence of any palpitations or atrial fibrillation that " at that time would reconsider initiation of anticoagulation and he has some at home which he will start.      F/up as needed. Continue to follow up with Dr. Farrar    Scribed for Tunde Rees DO by Eliana Goldberg PA-C. 2/2/2018  11:57 AM     I, Tunde Rees DO, personally performed the services described in this documentation as scribed by the above named individual in my presence, and it is both accurate and complete.  2/2/2018  11:58 AM    Tunde Rees DO  11:58 AM  02/02/18

## 2018-02-15 ENCOUNTER — OFFICE VISIT (OUTPATIENT)
Dept: CARDIOLOGY | Facility: CLINIC | Age: 63
End: 2018-02-15

## 2018-02-15 VITALS
DIASTOLIC BLOOD PRESSURE: 80 MMHG | BODY MASS INDEX: 23.43 KG/M2 | HEART RATE: 59 BPM | WEIGHT: 173 LBS | HEIGHT: 72 IN | SYSTOLIC BLOOD PRESSURE: 122 MMHG

## 2018-02-15 DIAGNOSIS — I49.5 TACHY-BRADY SYNDROME (HCC): ICD-10-CM

## 2018-02-15 DIAGNOSIS — E78.2 MIXED HYPERLIPIDEMIA: ICD-10-CM

## 2018-02-15 DIAGNOSIS — I48.0 PAROXYSMAL ATRIAL FIBRILLATION (HCC): Primary | ICD-10-CM

## 2018-02-15 DIAGNOSIS — Z98.890 STATUS POST CIRCUMFERENTIAL ABLATION OF PULMONARY VEIN: ICD-10-CM

## 2018-02-15 PROCEDURE — 99213 OFFICE O/P EST LOW 20 MIN: CPT | Performed by: NURSE PRACTITIONER

## 2018-02-15 NOTE — PROGRESS NOTES
Chief Complaint   Patient presents with   • Follow-up     For cardiac management. Last seen by Dr Rees for PAF on 2/2/18.He reports Dr Rees stopped Xarelto due to no AFib on 30 day event monitor. Patient did not bring medication, verbalized current medication list and reports same as when at Dr Rees office on 2/2/18.   • Med Refill     Needs refills on cardiac medication-90 day.       Subjective       Trevor Mendez is a 62 y.o. male with a history of sleep apnea, hypercholesteremia who in May of 2016 went into atrial fibrillation with RVR. He went to the hospital and was converted to NSR with IV Cardizem.  He was referred here for management.  Regular stress test done at consult showed no ischemic burden with slightly diminished exercise tolerance. Echo showed normal LV function. Holter in July of 2016 secondary to tachycardia and then pre-op evaluation showed a low heart rate of 43 showed no PAF, no SVT, and few PVCs.  He continued metoprolol therapy.  He reported more palpitations and event monitor was ordered which did show runs of PAF.  Xarelto was started and referral was made to Dr. Rees.  On 12/20/16, he underwent PV ablation.  Follow up with Dr. Rees on 3/3/17, flecainide was discontinued.  Thirty day event monitor was repeated with no recurrent afib, so Xarelto was stopped.  He was continued on aspirin and metoprolol 12.5 mg BID.  He came in today doing very well.  He denies any palpitations, chest pain, shortness of breath, or dizziness.  Labs have been monitored at his primary care office.  He apparently underwent penile implant surgery and developed infection with more surgery planned for 3/9/18.  No cardiac clearance has been requested and he has completed PAT.      HPI         Cardiac History:    Past Surgical History:   Procedure Laterality Date   • CARDIAC ELECTROPHYSIOLOGY PROCEDURE N/A 12/20/2016    Procedure: PVA and AFL RFA;  Surgeon: Tunde Rees DO;  Location: [facility]  LOCATION;  Service:    • CARDIOVASCULAR STRESS TEST  02/29/2016    5 min, 93% THR, 128/78, no ischemia   • CONVERTED (HISTORICAL) HOLTER  07/13/2016    Multiple PVCs 1.2%, baseline NSR rate of 66, rare PAC, max    • ECHO - CONVERTED  02/29/2016    EF 50-55%, mild to mod AR, mild to mod MR   • OTHER SURGICAL HISTORY  02/26/2016    CT head:  no old infarct seen   • OTHER SURGICAL HISTORY  09/06/2016    Event monitor- PAF, Xarelto added and referral made to Dr. Rees       Current Outpatient Prescriptions   Medication Sig Dispense Refill   • albuterol (PROVENTIL HFA;VENTOLIN HFA) 108 (90 BASE) MCG/ACT inhaler Inhale 2 puffs Every 4 (Four) Hours As Needed for wheezing.     • aspirin 81 MG tablet Take 81 mg by mouth Daily.     • docusate sodium (COLACE) 100 MG capsule Take 100 mg by mouth Daily.     • iron polysaccharides (NIFEREX) 150 MG capsule Take 150 mg by mouth 2 (two) times a day.     • metoprolol tartrate (LOPRESSOR) 25 MG tablet Take 1 tablet by mouth Every 12 (Twelve) Hours. 180 tablet 3   • omeprazole (PriLOSEC) 40 MG capsule Take 40 mg by mouth daily.     • simvastatin (ZOCOR) 40 MG tablet Take 40 mg by mouth every night.     • tolterodine (DETROL) 2 MG tablet Take 2 mg by mouth 2 (Two) Times a Day.     • vitamin D (ERGOCALCIFEROL) 13585 UNITS capsule capsule Take 50,000 Units by mouth 1 (one) time per week.       No current facility-administered medications for this visit.        Review of patient's allergies indicates no known allergies.    Past Medical History:   Diagnosis Date   • A-fib     CHADS-VASc = 0   • Anxiety and depression    • Arthritis    • GERD (gastroesophageal reflux disease)    • History of prostate surgery    • Hyperlipidemia    • Sleep apnea     CPAP   • Vitamin D deficiency        Social History     Social History   • Marital status:      Spouse name: N/A   • Number of children: 2   • Years of education: N/A     Occupational History   • Not on file.     Social History Main  "Topics   • Smoking status: Former Smoker     Packs/day: 3.00     Years: 20.00     Quit date: 1990   • Smokeless tobacco: Never Used   • Alcohol use Yes      Comment: Occ. Beer   • Drug use: No   • Sexual activity: Defer     Other Topics Concern   • Not on file     Social History Narrative    Patient consumes 1 pot coffee daily, Occasional soda, tea .     Patient lives at home with girlfriend.            Family History   Problem Relation Age of Onset   • Hypertension Mother    • Heart attack Father      passed away age 69   • Heart attack Maternal Grandfather    • Heart attack Paternal Grandmother        Review of Systems   Constitution: Negative for weakness and malaise/fatigue.   HENT: Negative.    Eyes: Negative.    Cardiovascular: Negative for chest pain, dyspnea on exertion, leg swelling, palpitations and syncope.   Respiratory: Negative for cough and shortness of breath.    Endocrine: Negative.    Hematologic/Lymphatic: Negative for bleeding problem. Does not bruise/bleed easily.   Skin: Negative.    Musculoskeletal: Negative.    Gastrointestinal: Negative for abdominal pain and melena.   Genitourinary: Negative for dysuria and hematuria.   Neurological: Negative for dizziness.   Psychiatric/Behavioral: Negative for altered mental status and depression.        Diabetes- No  Thyroid-normal, no labs available     Objective     /80 (BP Location: Right arm)  Pulse 59  Ht 182.9 cm (72.01\")  Wt 78.5 kg (173 lb)  BMI 23.46 kg/m2    Physical Exam   Constitutional: He is oriented to person, place, and time. He appears well-developed and well-nourished.   HENT:   Head: Normocephalic and atraumatic.   Eyes: Pupils are equal, round, and reactive to light.   Neck: Normal range of motion. Neck supple.   Cardiovascular: Normal rate, regular rhythm and intact distal pulses.    No murmur heard.  Pulmonary/Chest: Effort normal and breath sounds normal. No respiratory distress. He has no wheezes. He has no rales. "   Abdominal: Soft. Bowel sounds are normal.   Musculoskeletal: Normal range of motion. He exhibits no edema.   Neurological: He is alert and oriented to person, place, and time.   Skin: Skin is warm and dry.   Psychiatric: He has a normal mood and affect.        ECG 12 Lead  Date/Time: 2/16/2018 12:11 PM  Performed by: JENNA INMAN  Authorized by: JENNA INMAN   Comparison: compared with previous ECG from 8/10/2017  Similar to previous ECG  Comparison to previous ECG: Sinus at 72, more bradycardic now   Rhythm: sinus bradycardia  Rate: bradycardic  BPM: 59  Clinical impression: non-specific ECG  Comments: UT 156ms  QTc 409 ms                  Assessment/Plan    Heart rate and blood pressure stable.  EKG done for PAF s/p PVA off flecainide now showed sinus bradycardia at 59 bpm normal QTc.  He did not bring his list of medications, but appears he is taking metoprolol at 25 mg BID.  Continue the same.  Labs will be continued with you.  Could we get next copy?  He remains active without any symptoms.  No further work up advised.  He was encouraged to limit caffeine intake, increase water intake.  His BMI is normal.  He is advised to follow heart healthy diet, low in saturated fat and sodium.  He appears to be stable.  If cardiac clearance is needed, he is advised to contact our office.  We will see him back in six months or sooner if needed.      Trevor was seen today for follow-up and med refill.    Diagnoses and all orders for this visit:    Paroxysmal atrial fibrillation    Tachy-rajiv syndrome    Status post circumferential ablation of pulmonary vein    Mixed hyperlipidemia    Other orders  -     metoprolol tartrate (LOPRESSOR) 25 MG tablet; Take 1 tablet by mouth Every 12 (Twelve) Hours.  -     ECG 12 Lead                    Electronically signed by KATHERINE Rodriguez,  February 16, 2018 12:20 PM

## 2018-02-16 PROCEDURE — 93000 ELECTROCARDIOGRAM COMPLETE: CPT | Performed by: NURSE PRACTITIONER

## 2018-08-16 ENCOUNTER — OFFICE VISIT (OUTPATIENT)
Dept: CARDIOLOGY | Facility: CLINIC | Age: 63
End: 2018-08-16

## 2018-08-16 VITALS
SYSTOLIC BLOOD PRESSURE: 110 MMHG | HEART RATE: 61 BPM | DIASTOLIC BLOOD PRESSURE: 80 MMHG | HEIGHT: 72 IN | BODY MASS INDEX: 23.7 KG/M2 | WEIGHT: 175 LBS

## 2018-08-16 DIAGNOSIS — I49.5 TACHY-BRADY SYNDROME (HCC): ICD-10-CM

## 2018-08-16 DIAGNOSIS — I48.0 PAROXYSMAL ATRIAL FIBRILLATION (HCC): ICD-10-CM

## 2018-08-16 DIAGNOSIS — E78.2 MIXED HYPERLIPIDEMIA: ICD-10-CM

## 2018-08-16 DIAGNOSIS — I48.0 PAF (PAROXYSMAL ATRIAL FIBRILLATION) (HCC): Primary | ICD-10-CM

## 2018-08-16 DIAGNOSIS — Z98.890 STATUS POST CIRCUMFERENTIAL ABLATION OF PULMONARY VEIN: ICD-10-CM

## 2018-08-16 DIAGNOSIS — I47.1 SVT (SUPRAVENTRICULAR TACHYCARDIA) (HCC): ICD-10-CM

## 2018-08-16 PROCEDURE — 99213 OFFICE O/P EST LOW 20 MIN: CPT | Performed by: NURSE PRACTITIONER

## 2018-08-16 PROCEDURE — 93000 ELECTROCARDIOGRAM COMPLETE: CPT | Performed by: NURSE PRACTITIONER

## 2018-08-16 RX ORDER — POLYETHYLENE GLYCOL 3350 17 G/17G
17 POWDER, FOR SOLUTION ORAL DAILY PRN
COMMUNITY

## 2018-08-16 NOTE — PROGRESS NOTES
Chief Complaint   Patient presents with   • Follow-up     Cardiac mangement. Last labs 2-3 months per PCP.   • Dizziness     He reports will occasionally have some dizziness when he first stands up in the morning, nothing any different.       Subjective       Trevor Mendez is a 63 y.o. male with a history of sleep apnea, hypercholesteremia who in May of 2016 went into atrial fibrillation with RVR. He went to the hospital and was converted to NSR with IV Cardizem.  He was referred here for management.  Regular stress test done at consult showed no ischemic burden with slightly diminished exercise tolerance. Echo showed normal LV function. Holter in July of 2016 secondary to tachycardia and then pre-op evaluation showed a low heart rate of 43 showed no PAF, no SVT, and few PVCs.  He continued metoprolol therapy.  He reported more palpitations and event monitor was ordered which did show runs of PAF.  Xarelto was started and referral was made to Dr. Rees.  On 12/20/16, he underwent PV ablation.  Follow up with Dr. Rees on 3/3/17, flecainide was discontinued.  Thirty day event monitor was repeated with no recurrent afib, so Xarelto was stopped.  He was continued on aspirin and metoprolol 12.5 mg BID.  He came in today doing very well. He continues to work driving a truck.  He denies any palpitations, chest pain, shortness of breath. He occasionally feels mild dizziness in the am but subsides by lunch time. No syncope.  Labs have been monitored at his primary care office.   HPI         Cardiac History:    Past Surgical History:   Procedure Laterality Date   • CARDIAC ELECTROPHYSIOLOGY PROCEDURE N/A 12/20/2016    Procedure: PVA and AFL RFA;  Surgeon: Tunde Rees DO;  Location: [facility] INVASIVE LOCATION;  Service:    • CARDIOVASCULAR STRESS TEST  02/29/2016    5 min, 93% THR, 128/78, no ischemia   • CONVERTED (HISTORICAL) HOLTER  07/13/2016    Multiple PVCs 1.2%, baseline NSR rate of 66, rare PAC, max    •  ECHO - CONVERTED  02/29/2016    EF 50-55%, mild to mod AR, mild to mod MR   • OTHER SURGICAL HISTORY  02/26/2016    CT head:  no old infarct seen   • OTHER SURGICAL HISTORY  09/06/2016    Event monitor- PAF, Xarelto added and referral made to Dr. Rees       Current Outpatient Prescriptions   Medication Sig Dispense Refill   • albuterol (PROVENTIL HFA;VENTOLIN HFA) 108 (90 BASE) MCG/ACT inhaler Inhale 2 puffs Every 4 (Four) Hours As Needed for wheezing.     • aspirin 81 MG tablet Take 81 mg by mouth Daily.     • iron polysaccharides (NIFEREX) 150 MG capsule Take 150 mg by mouth 2 (two) times a day.     • metoprolol tartrate (LOPRESSOR) 25 MG tablet Take 1 tablet by mouth Every 12 (Twelve) Hours. Patient taking 1/2 tablet  tablet 3   • omeprazole (PriLOSEC) 40 MG capsule Take 40 mg by mouth daily.     • polyethylene glycol (MIRALAX) packet Take 17 g by mouth Daily As Needed.     • simvastatin (ZOCOR) 40 MG tablet Take 40 mg by mouth every night.     • vitamin D (ERGOCALCIFEROL) 88019 UNITS capsule capsule Take 50,000 Units by mouth 1 (one) time per week.       No current facility-administered medications for this visit.        Patient has no known allergies.    Past Medical History:   Diagnosis Date   • A-fib (CMS/[facility])     CHADS-VASc = 0   • Anxiety and depression    • Arthritis    • GERD (gastroesophageal reflux disease)    • History of prostate surgery    • Hyperlipidemia    • Sleep apnea     CPAP   • Vitamin D deficiency        Social History     Social History   • Marital status:      Spouse name: N/A   • Number of children: 2   • Years of education: N/A     Occupational History   • Not on file.     Social History Main Topics   • Smoking status: Former Smoker     Packs/day: 3.00     Years: 20.00     Quit date: 1990   • Smokeless tobacco: Never Used   • Alcohol use Yes      Comment: Occ. Beer   • Drug use: No   • Sexual activity: Defer     Other Topics Concern   • Not on file     Social History  "Narrative    Patient consumes 1 pot coffee daily, Occasional soda, tea .     Patient lives at home with girlfriend.            Family History   Problem Relation Age of Onset   • Hypertension Mother    • Heart attack Father         passed away age 69   • Heart attack Maternal Grandfather    • Heart attack Paternal Grandmother        Review of Systems   Constitution: Negative. Negative for diaphoresis, weakness, malaise/fatigue and weight loss.   HENT: Negative.    Eyes: Negative.    Cardiovascular: Negative for chest pain, dyspnea on exertion, leg swelling, palpitations and syncope.   Respiratory: Negative for cough and shortness of breath.    Endocrine: Negative.    Hematologic/Lymphatic: Negative for bleeding problem. Does not bruise/bleed easily.   Skin: Negative.    Musculoskeletal: Negative for back pain, falls and myalgias.   Gastrointestinal: Negative for abdominal pain and melena.   Genitourinary: Negative for dysuria.   Neurological: Positive for dizziness.   Psychiatric/Behavioral: Negative for altered mental status and depression.   Allergic/Immunologic: Negative.         Diabetes- No  Thyroid-normal    Objective     /80 (BP Location: Right arm)   Pulse 61   Ht 182.9 cm (72.01\")   Wt 79.4 kg (175 lb)   BMI 23.73 kg/m²     Physical Exam   Constitutional: He is oriented to person, place, and time. He appears well-developed and well-nourished.   HENT:   Head: Normocephalic.   Eyes: Pupils are equal, round, and reactive to light.   Neck: Normal range of motion.   Cardiovascular: Normal rate, regular rhythm and intact distal pulses.    Murmur heard.  Pulmonary/Chest: Effort normal and breath sounds normal. No respiratory distress. He has no wheezes.   Abdominal: Soft. Bowel sounds are normal.   Musculoskeletal: Normal range of motion. He exhibits no edema.   Neurological: He is alert and oriented to person, place, and time.   Skin: Skin is warm and dry.   Psychiatric: He has a normal mood and affect. "   Nursing note and vitals reviewed.       ECG 12 Lead  Date/Time: 8/16/2018 10:54 AM  Performed by: JENNA IMNAN  Authorized by: JENNA INMAN   Comparison: compared with previous ECG from 2/15/2018  Similar to previous ECG  Rhythm: sinus rhythm  Rate: normal  BPM: 61  QRS axis: normal  Clinical impression: normal ECG                  Assessment/Plan    Heart rate and blood pressure stable. EKG done today for PAF s/p PVA showed NSR, normal HI and QT intervals. He is on low dose metoprolol. Continue the same. He has had no reoccurrence of the arrhythmia. He tolerates simvastatin for hyperlipidemia. Labs are followed with primary care. Advised to follow low cholesterol diet. Limit caffeine. Adequate water intake. BMI is normal. He uses CPAP for sleep apnea, most of the time. He is active daily with working or boating without symptoms. Will continue conservative management. Should he develop any reoccurrence of symptoms, advised to notify our office. We will see him back in six months or sooner if needed.   Trevor was seen today for follow-up and dizziness.    Diagnoses and all orders for this visit:    PAF (paroxysmal atrial fibrillation) (CMS/HCC)    Tachy-rajiv syndrome (CMS/HCC)    Paroxysmal atrial fibrillation (CMS/HCC)    SVT (supraventricular tachycardia) (CMS/HCC)    Mixed hyperlipidemia    Other orders  -     metoprolol tartrate (LOPRESSOR) 25 MG tablet; Take 1 tablet by mouth Every 12 (Twelve) Hours. Patient taking 1/2 tablet BID  -     ECG 12 Lead        Patient's Body mass index is 23.73 kg/m². BMI is within normal parameters. No follow-up required.                 Electronically signed by KATHERINE Rodriguez,  August 16, 2018 10:57 AM

## 2019-02-14 ENCOUNTER — OFFICE VISIT (OUTPATIENT)
Dept: CARDIOLOGY | Facility: CLINIC | Age: 64
End: 2019-02-14

## 2019-02-14 VITALS
HEIGHT: 72 IN | WEIGHT: 177 LBS | HEART RATE: 55 BPM | SYSTOLIC BLOOD PRESSURE: 130 MMHG | DIASTOLIC BLOOD PRESSURE: 82 MMHG | BODY MASS INDEX: 23.98 KG/M2

## 2019-02-14 DIAGNOSIS — E78.2 MIXED HYPERLIPIDEMIA: ICD-10-CM

## 2019-02-14 DIAGNOSIS — I48.0 PAF (PAROXYSMAL ATRIAL FIBRILLATION) (HCC): Primary | ICD-10-CM

## 2019-02-14 DIAGNOSIS — R00.1 BRADYCARDIA: ICD-10-CM

## 2019-02-14 DIAGNOSIS — G47.33 OBSTRUCTIVE SLEEP APNEA: ICD-10-CM

## 2019-02-14 DIAGNOSIS — Z98.890 STATUS POST CIRCUMFERENTIAL ABLATION OF PULMONARY VEIN: ICD-10-CM

## 2019-02-14 PROCEDURE — 99213 OFFICE O/P EST LOW 20 MIN: CPT | Performed by: NURSE PRACTITIONER

## 2019-02-14 PROCEDURE — 93000 ELECTROCARDIOGRAM COMPLETE: CPT | Performed by: NURSE PRACTITIONER

## 2019-02-14 RX ORDER — FERROUS SULFATE 325(65) MG
325 TABLET ORAL
COMMUNITY

## 2019-02-14 NOTE — PROGRESS NOTES
Chief Complaint   Patient presents with   • Follow-up     For cardiac management. Patient is on aspirin. Last lab work was done about 3 months ago per PCP, not in chart.    • Med Refill     Needs refills on cardiac medications. 90 day supplies to [facility].       Cardiac Complaints  none      Subjective   Trevor Mendez is a 63 y.o. male with sleep apnea, hypercholesteremia,  and atrial fibrillation with RVR that developed in 2016. He went to the hospital and was converted to NSR with IV Cardizem and was referred here for management. Regular stress test done at consult showed no ischemic burden with slightly diminished exercise tolerance. Echo showed normal LV function. Holter in July of 2016 secondary to tachycardia and then pre-op evaluation showed a low heart rate of 43 showed no PAF, no SVT, and few PVCs.  He continued metoprolol therapy. He reported more palpitations and event monitor was ordered which did show runs of PAF. Xarelto was started and referral was made to Dr. Rees.  On 12/20/16, he underwent PV ablation.  Follow up with Dr. Rees on 3/3/17, flecainide was discontinued.  Thirty day event monitor was repeated with no recurrent afib, so Xarelto was stopped.  He was continued on aspirin and metoprolol 12.5 mg BID.  He returns today for follow up and denies any cardiac concerns.  Chest pain, shortness of breath, and palpitations are denied. Labs are done with PCP and were last checked about 3 months ago.  He reports all looked okay, no copy available. Refills of metoprolol requested.          Cardiac History  Past Surgical History:   Procedure Laterality Date   • CARDIAC ELECTROPHYSIOLOGY PROCEDURE N/A 12/20/2016    Procedure: PVA and AFL RFA;  Surgeon: Tunde Rees DO;  Location: [facility] INVASIVE LOCATION;  Service:    • CARDIOVASCULAR STRESS TEST  02/29/2016    5 min, 93% THR, 128/78, no ischemia   • CONVERTED (HISTORICAL) HOLTER  07/13/2016    Multiple PVCs 1.2%, baseline NSR rate  of 66, rare PAC, max    • ECHO - CONVERTED  02/29/2016    EF 50-55%, mild to mod AR, mild to mod MR   • OTHER SURGICAL HISTORY  02/26/2016    CT head:  no old infarct seen   • OTHER SURGICAL HISTORY  09/06/2016    Event monitor- PAF, Xarelto added and referral made to Dr. Rees       Current Outpatient Medications   Medication Sig Dispense Refill   • albuterol (PROVENTIL HFA;VENTOLIN HFA) 108 (90 BASE) MCG/ACT inhaler Inhale 2 puffs Every 4 (Four) Hours As Needed for wheezing.     • aspirin 81 MG tablet Take 81 mg by mouth Daily.     • ferrous sulfate 325 (65 FE) MG tablet Take 325 mg by mouth Daily With Breakfast.     • metoprolol tartrate (LOPRESSOR) 25 MG tablet Patient taking 1/2 to 1 tablet  tablet 3   • omeprazole (PriLOSEC) 40 MG capsule Take 40 mg by mouth daily.     • polyethylene glycol (MIRALAX) packet Take 17 g by mouth Daily As Needed.     • simvastatin (ZOCOR) 40 MG tablet Take 40 mg by mouth every night.     • vitamin D (ERGOCALCIFEROL) 32072 UNITS capsule capsule Take 50,000 Units by mouth 1 (one) time per week.       No current facility-administered medications for this visit.        Patient has no known allergies.    Past Medical History:   Diagnosis Date   • A-fib (CMS/[facility])     CHADS-VASc = 0   • Anxiety and depression    • Arthritis    • GERD (gastroesophageal reflux disease)    • History of prostate surgery    • Hyperlipidemia    • Sleep apnea     CPAP   • Vitamin D deficiency        Social History     Socioeconomic History   • Marital status:      Spouse name: Not on file   • Number of children: 2   • Years of education: Not on file   • Highest education level: Not on file   Social Needs   • Financial resource strain: Not on file   • Food insecurity - worry: Not on file   • Food insecurity - inability: Not on file   • Transportation needs - medical: Not on file   • Transportation needs - non-medical: Not on file   Occupational History   • Not on file   Tobacco Use   •  "Smoking status: Former Smoker     Packs/day: 3.00     Years: 20.00     Pack years: 60.00     Last attempt to quit:      Years since quittin.1   • Smokeless tobacco: Never Used   Substance and Sexual Activity   • Alcohol use: Yes     Comment: Occ. Beer   • Drug use: No   • Sexual activity: Defer   Other Topics Concern   • Not on file   Social History Narrative    Patient consumes 1 pot coffee daily, Occasional soda, tea .     Patient lives at home with girlfriend.        Family History   Problem Relation Age of Onset   • Hypertension Mother    • Heart attack Father         passed away age 69   • Heart attack Maternal Grandfather    • Heart attack Paternal Grandmother        Review of Systems   Constitution: Negative for weakness and malaise/fatigue.   Cardiovascular: Negative for chest pain, dyspnea on exertion, irregular heartbeat, near-syncope, orthopnea, palpitations and syncope.   Respiratory: Negative for cough, shortness of breath and wheezing.    Musculoskeletal: Negative for back pain, joint pain and joint swelling.   Gastrointestinal: Negative for anorexia, heartburn, nausea and vomiting.   Genitourinary: Negative for dysuria, hematuria, hesitancy and nocturia.   Neurological: Negative for dizziness, light-headedness and loss of balance.   Psychiatric/Behavioral: Negative for depression and memory loss. The patient is not nervous/anxious.            Objective     /82 (BP Location: Left arm)   Pulse 55   Ht 182.9 cm (72.01\")   Wt 80.3 kg (177 lb)   BMI 24.00 kg/m²     Physical Exam   Constitutional: He is oriented to person, place, and time. He appears well-developed and well-nourished.   HENT:   Head: Normocephalic and atraumatic.   Eyes: EOM are normal. Pupils are equal, round, and reactive to light.   Neck: Normal range of motion. Neck supple.   Cardiovascular: Regular rhythm. Bradycardia present.   Murmur heard.  Pulmonary/Chest: Effort normal and breath sounds normal.   Abdominal: " Soft.   Musculoskeletal: Normal range of motion.   Neurological: He is alert and oriented to person, place, and time.   Skin: Skin is warm and dry.   Psychiatric: He has a normal mood and affect. His behavior is normal.         ECG 12 Lead  Date/Time: 2/14/2019 9:05 AM  Performed by: Phoebe Ramirez APRN  Authorized by: Phoebe Ramirez APRN   Rhythm: sinus bradycardia  BPM: 55    Clinical impression: abnormal EKG            Assessment/Plan     HR is slightly bradycardic today, most likely beta blocker therapy.  Continue with 1/2 tablet BID.  If symptomatic bradycardia should develop, patient aware to call.  EKG done today for PAF with history of ablation shows a sinus bradycardia.  He will contain with ASA alone for anticoagulation as sinus has been maintained and palpitations denied.  If palpitations should develop, patient aware to call.  Limited caffeine intake recommended. Hyperlipidemia maintained with statin therapy.  Patient reports FLP monitored by PCP and last looked okay. For now, he will continue on same dosing and will discuss any changes with your office.  Could we have most recent labs for review?  He has continued on CPAP therapy and reports compliance for sleep apnea management.  He was urged to continue. No new workup recommended at this time as cardiac concerns denied and status appears stable.  BMI stable at 24.  Continued cardiac diet and activity as tolerated advised.  6 month follow up scheduled or sooner if needed.        Problems Addressed this Visit        Cardiovascular and Mediastinum    PAF (paroxysmal atrial fibrillation) (CMS/HCC) - Primary    Relevant Medications    metoprolol tartrate (LOPRESSOR) 25 MG tablet    Other Relevant Orders    ECG 12 Lead    Mixed hyperlipidemia       Respiratory    Obstructive sleep apnea       Other    Status post circumferential ablation of pulmonary vein      Other Visit Diagnoses     Bradycardia              Patient's Body mass index is 24 kg/m².  BMI is within normal parameters. No follow-up required..            Electronically signed by KATHERINE Mojica February 15, 2019 11:16 AM

## 2019-03-31 NOTE — TELEPHONE ENCOUNTER
Wife called wanted to know if it was ok to stop the xarelto 20 based on his monitor results. Results under cardiology. Please advise   Home

## 2019-08-15 ENCOUNTER — OFFICE VISIT (OUTPATIENT)
Dept: CARDIOLOGY | Facility: CLINIC | Age: 64
End: 2019-08-15

## 2019-08-15 VITALS
HEIGHT: 72 IN | WEIGHT: 167 LBS | BODY MASS INDEX: 22.62 KG/M2 | HEART RATE: 58 BPM | DIASTOLIC BLOOD PRESSURE: 72 MMHG | SYSTOLIC BLOOD PRESSURE: 122 MMHG

## 2019-08-15 DIAGNOSIS — Z98.890 STATUS POST CIRCUMFERENTIAL ABLATION OF PULMONARY VEIN: ICD-10-CM

## 2019-08-15 DIAGNOSIS — I48.0 PAF (PAROXYSMAL ATRIAL FIBRILLATION) (HCC): Primary | ICD-10-CM

## 2019-08-15 DIAGNOSIS — E78.2 MIXED HYPERLIPIDEMIA: ICD-10-CM

## 2019-08-15 DIAGNOSIS — I47.1 SVT (SUPRAVENTRICULAR TACHYCARDIA) (HCC): ICD-10-CM

## 2019-08-15 PROCEDURE — 93000 ELECTROCARDIOGRAM COMPLETE: CPT | Performed by: NURSE PRACTITIONER

## 2019-08-15 PROCEDURE — 99213 OFFICE O/P EST LOW 20 MIN: CPT | Performed by: NURSE PRACTITIONER

## 2019-08-15 NOTE — PROGRESS NOTES
Chief Complaint   Patient presents with   • Follow-up     Cardiac management. He had fall the other day over a ladder, has some sore ribs. Does not need refills at this time.   • Lab     Last labs couple months ago per PCP at [facility].       Subjective       Trevor Mendez is a 64 y.o. male with sleep apnea, hypercholesteremia,  and atrial fibrillation with RVR that developed in 2016. He went to the hospital and was converted to NSR with IV Cardizem and was referred here for management. Regular stress test done at consult showed no ischemic burden with slightly diminished exercise tolerance. Echo showed normal LV function. Holter in July of 2016 secondary to tachycardia and then pre-op evaluation showed a low heart rate of 43 showed no PAF, no SVT, and few PVCs.  He continued metoprolol therapy. He reported more palpitations and event monitor was ordered which did show runs of PAF. Xarelto was started and referral was made to Dr. Rees.  On 12/20/16, he underwent PV ablation.  Follow up with Dr. Rees on 3/3/17, flecainide was discontinued.  Thirty day event monitor was repeated with no recurrent afib, so Xarelto was stopped.  He was continued on aspirin and metoprolol 12.5 mg BID. He came today for follow up. Denies CP, SOB, palpitations, dizziness, TIA. Two weeks ago he had a fall from a ladder. Did not seek treatment. Continued soreness to left ribs. Labs followed per PCP.     HPI         Cardiac History:    Past Surgical History:   Procedure Laterality Date   • CARDIAC ELECTROPHYSIOLOGY PROCEDURE N/A 12/20/2016    Procedure: PVA and AFL RFA;  Surgeon: Tunde Rees DO;  Location: [facility] INVASIVE LOCATION;  Service:    • CARDIOVASCULAR STRESS TEST  02/29/2016    5 min, 93% THR, 128/78, no ischemia   • CONVERTED (HISTORICAL) HOLTER  07/13/2016    Multiple PVCs 1.2%, baseline NSR rate of 66, rare PAC, max    • ECHO - CONVERTED  02/29/2016    EF 50-55%, mild to mod AR, mild to mod MR   • OTHER SURGICAL HISTORY   2016    CT head:  no old infarct seen   • OTHER SURGICAL HISTORY  2016    Event monitor- PAF, Xarelto added and referral made to Dr. Rees       Current Outpatient Medications   Medication Sig Dispense Refill   • albuterol (PROVENTIL HFA;VENTOLIN HFA) 108 (90 BASE) MCG/ACT inhaler Inhale 2 puffs Every 4 (Four) Hours As Needed for wheezing.     • aspirin 81 MG tablet Take 81 mg by mouth Daily.     • Cholecalciferol (VITAMIN D3) 5000 units capsule capsule Take 5,000 Units by mouth 2 (Two) Times a Day.     • ferrous sulfate 325 (65 FE) MG tablet Take 325 mg by mouth Daily With Breakfast.     • metoprolol tartrate (LOPRESSOR) 25 MG tablet Patient taking 1/2 to 1 tablet  tablet 3   • omeprazole (PriLOSEC) 40 MG capsule Take 40 mg by mouth daily.     • polyethylene glycol (MIRALAX) packet Take 17 g by mouth Daily As Needed.     • simvastatin (ZOCOR) 40 MG tablet Take 40 mg by mouth every night.       No current facility-administered medications for this visit.        Patient has no known allergies.    Past Medical History:   Diagnosis Date   • A-fib (CMS/[facility])     CHADS-VASc = 0   • Anxiety and depression    • Arthritis    • GERD (gastroesophageal reflux disease)    • History of prostate surgery    • Hyperlipidemia    • Sleep apnea     CPAP   • Vitamin D deficiency        Social History     Socioeconomic History   • Marital status:      Spouse name: Not on file   • Number of children: 2   • Years of education: Not on file   • Highest education level: Not on file   Tobacco Use   • Smoking status: Former Smoker     Packs/day: 3.00     Years: 20.00     Pack years: 60.00     Last attempt to quit:      Years since quittin.6   • Smokeless tobacco: Never Used   Substance and Sexual Activity   • Alcohol use: Yes     Comment: rarely drinks beer   • Drug use: No   • Sexual activity: Defer   Social History Narrative    Patient consumes 1 pot coffee daily, Occasional soda, tea .     Patient lives  "at home with girlfriend.        Family History   Problem Relation Age of Onset   • Hypertension Mother    • Heart attack Father         passed away age 69   • Heart attack Maternal Grandfather    • Heart attack Paternal Grandmother        Review of Systems   Constitution: Positive for weight loss. Negative for decreased appetite, weakness and malaise/fatigue.   HENT: Negative.    Eyes: Negative.    Cardiovascular: Negative for chest pain, dyspnea on exertion, leg swelling, orthopnea, palpitations and syncope.   Respiratory: Negative for cough.    Endocrine: Negative.    Hematologic/Lymphatic: Negative.    Skin: Negative.    Musculoskeletal: Positive for falls.   Gastrointestinal: Negative for abdominal pain and melena.   Genitourinary: Negative for dysuria and hematuria.   Neurological: Negative for dizziness.   Psychiatric/Behavioral: Negative for altered mental status and depression.   Allergic/Immunologic: Negative.       Diabetes- No  Thyroid-normal    Objective     /72 (BP Location: Right arm)   Pulse 58   Ht 182.9 cm (72.01\")   Wt 75.8 kg (167 lb)   BMI 22.64 kg/m²     Physical Exam   Constitutional: He is oriented to person, place, and time. He appears well-developed and well-nourished. No distress.   HENT:   Head: Normocephalic and atraumatic.   Eyes: Pupils are equal, round, and reactive to light.   Neck: Normal range of motion.   Cardiovascular: Regular rhythm and intact distal pulses. Bradycardia present.   Pulmonary/Chest: Effort normal and breath sounds normal.   Abdominal: Soft. Bowel sounds are normal.   Musculoskeletal: Normal range of motion.   Neurological: He is alert and oriented to person, place, and time.   Skin: Skin is warm and dry. He is not diaphoretic.   Psychiatric: He has a normal mood and affect.   Nursing note and vitals reviewed.       ECG 12 Lead  Date/Time: 8/15/2019 9:18 AM  Performed by: Mary Ellen William APRN  Authorized by: Mary Ellen William APRN   Comparison: compared with " previous ECG from 2/14/2019  Similar to previous ECG  Rhythm: sinus bradycardia  Rate: bradycardic  BPM: 58    Clinical impression: non-specific ECG  Comments:  ms  QTc 402 ms                  Assessment/Plan    Heart rate and blood pressure are stable. EKG showed sinus bradycardia at 58 bpm, normal OK and QT intervals. He is s/p PVA for PAF and has remained in sinus. Continue low dose beta blocker. Lipids managed with simvastatin and well controlled other than elevated trigs. Limit sugars and carbs. Cardiac work up in 2016 showed no ischemia, mild AR. He has no new symptoms. Continue to manage him conservatively. BMI is normal. He admits to following heart healthy diet with lots of fruits and vegetables. He is encouraged to remain active. He appears stable. We will see him back in six months or sooner if needed.     Trevor was seen today for follow-up and lab.    Diagnoses and all orders for this visit:    PAF (paroxysmal atrial fibrillation) (CMS/HCC)  -     ECG 12 Lead    SVT (supraventricular tachycardia) (CMS/HCC)    Status post circumferential ablation of pulmonary vein    Mixed hyperlipidemia        Patient's Body mass index is 22.64 kg/m². BMI is within normal parameters. No follow-up required..               Electronically signed by KATHERINE Rodriguez,  August 15, 2019 10:15 AM

## 2021-11-03 ENCOUNTER — OUTSIDE FACILITY SERVICE (OUTPATIENT)
Dept: CARDIOLOGY | Facility: CLINIC | Age: 66
End: 2021-11-03

## 2021-11-03 PROCEDURE — 93244 EXT ECG>48HR<7D REV&INTERPJ: CPT | Performed by: INTERNAL MEDICINE

## 2022-01-13 PROCEDURE — 93306 TTE W/DOPPLER COMPLETE: CPT | Performed by: INTERNAL MEDICINE

## 2022-01-20 ENCOUNTER — OUTSIDE FACILITY SERVICE (OUTPATIENT)
Dept: CARDIOLOGY | Facility: CLINIC | Age: 67
End: 2022-01-20

## 2024-12-10 ENCOUNTER — PATIENT ROUNDING (BHMG ONLY) (OUTPATIENT)
Dept: CARDIOLOGY | Facility: CLINIC | Age: 69
End: 2024-12-10
Payer: MEDICARE

## 2024-12-10 ENCOUNTER — OFFICE VISIT (OUTPATIENT)
Dept: CARDIOLOGY | Facility: CLINIC | Age: 69
End: 2024-12-10
Payer: MEDICARE

## 2024-12-10 VITALS
HEART RATE: 58 BPM | DIASTOLIC BLOOD PRESSURE: 75 MMHG | SYSTOLIC BLOOD PRESSURE: 125 MMHG | HEIGHT: 72 IN | WEIGHT: 170 LBS | BODY MASS INDEX: 23.03 KG/M2

## 2024-12-10 DIAGNOSIS — R00.1 BRADYCARDIA, SINUS: ICD-10-CM

## 2024-12-10 DIAGNOSIS — I10 PRIMARY HYPERTENSION: ICD-10-CM

## 2024-12-10 DIAGNOSIS — I48.0 PAF (PAROXYSMAL ATRIAL FIBRILLATION): Primary | ICD-10-CM

## 2024-12-10 DIAGNOSIS — I47.10 SVT (SUPRAVENTRICULAR TACHYCARDIA): ICD-10-CM

## 2024-12-10 DIAGNOSIS — I20.89 ANGINAL EQUIVALENT: ICD-10-CM

## 2024-12-10 DIAGNOSIS — E78.2 MIXED HYPERLIPIDEMIA: ICD-10-CM

## 2024-12-10 DIAGNOSIS — R06.02 SHORTNESS OF BREATH: ICD-10-CM

## 2024-12-10 PROCEDURE — 93000 ELECTROCARDIOGRAM COMPLETE: CPT | Performed by: NURSE PRACTITIONER

## 2024-12-10 PROCEDURE — 99204 OFFICE O/P NEW MOD 45 MIN: CPT | Performed by: NURSE PRACTITIONER

## 2024-12-10 RX ORDER — LOSARTAN POTASSIUM 25 MG/1
25 TABLET ORAL DAILY
Qty: 90 TABLET | Refills: 2 | Status: SHIPPED | OUTPATIENT
Start: 2024-12-10 | End: 2024-12-10

## 2024-12-10 RX ORDER — METOPROLOL SUCCINATE 25 MG/1
25 TABLET, EXTENDED RELEASE ORAL NIGHTLY
Qty: 90 TABLET | Refills: 3 | Status: SHIPPED | OUTPATIENT
Start: 2024-12-10

## 2024-12-10 NOTE — PROGRESS NOTES
December 10, 2024    Hello, may I speak with Trevor Mendez?    My name is Tessa at Dr. Farrar's office.      I am  with [facility]  [address] [place] [address] [phone].    Before we get started may I verify your date of birth? 1955    I am calling to officially welcome you to our practice and ask about your recent visit. Is this a good time to talk? yes    Tell me about your visit with us. What things went well?  I was very satified with my visit today.  Phoebe was really good and was very knowledgeable about my information.       We're always looking for ways to make our patients' experiences even better. Do you have recommendations on ways we may improve?  no    Overall were you satisfied with your first visit to our practice? yes       I appreciate you taking the time to speak with me today. Is there anything else I can do for you? no      Thank you, and have a great day.

## 2024-12-10 NOTE — PROGRESS NOTES
Chief Complaint   Patient presents with    Establish Care     Self referral for blood pressure was in Er Sunday night for blood pressure, Patient states that he had a really bad headache when his blood pressure was so lovely. Blood pressure was 181/116 pulse 101. Labs in chart 1/17/19, recent labs done in ER on Sunday not in chart    Med Refill     90 day refills on medications to [facility], no medication list brought verbally went over with patient    Shortness of Breath     States he was short of breath when his blood pressure was high        Cardiac Complaints  dyspnea      Subjective   Trevor Mendez is a 69 y.o. male with sleep apnea, HTN,  hypercholesteremia,  and atrial fibrillation with RVR that developed in 2016. He went to the hospital and was converted to NSR with IV Cardizem and was referred here for management. Regular stress test done at consult showed no ischemic burden with slightly diminished exercise tolerance. Echo showed normal LV function. Holter in July of 2016 secondary to tachycardia and then pre-op evaluation showed a low heart rate of 43 showed no PAF, no SVT, and few PVCs.  He continued metoprolol therapy. He reported more palpitations and event monitor was ordered which did show runs of PAF. Xarelto was started and referral was made to Dr. Rees.  On 12/20/16, he underwent PV ablation.  Follow up with Dr. Rees on 3/3/17, flecainide was discontinued.  Thirty day event monitor was repeated with no recurrent afib, so Xarelto was stopped.  He was continued on aspirin and metoprolol 12.5 mg BID. He returns today for follow up and denies any cardiac concerns.  Chest pain, shortness of breath, and palpitations are denied. Labs are done with PCP and were last checked about 3 months ago.  He reports all looked okay, no copy available. Refills of metoprolol requested.     Patient comes today after not being seen since 2019. Patient states he was in the hospital on Sunday with markedly  elevated BP. He admits at that time he had been without his metoprolol for over a week. He reports once he began taking it again, he immediately felt better. He states his BP at [facility] was 186/116 and he was weak, had a bad headache, and was very SOA. Since then he feels better but does occasionally get winded with activity. He denies any recent cardiac workup. Labs at ER 12/8/2024 showed: HH 16.6/48.5%, Platelet 315, Na 132, K 4.1, BUN 16, Creatinine 1.1, GFR >60, Troponin 0.01. Refills requested.          Cardiac History  Past Surgical History:   Procedure Laterality Date    CARDIAC ELECTROPHYSIOLOGY PROCEDURE N/A 12/20/2016    Procedure: PVA and AFL RFA;  Surgeon: Tunde Rees DO;  Location: [facility] INVASIVE LOCATION;  Service:     CARDIOVASCULAR STRESS TEST  02/29/2016    5 min, 93% THR, 128/78, no ischemia    CONVERTED (HISTORICAL) HOLTER  07/13/2016    Multiple PVCs 1.2%, baseline NSR rate of 66, rare PAC, max     ECHO - CONVERTED  02/29/2016    EF 50-55%, mild to mod AR, mild to mod MR    ECHO - CONVERTED  01/20/2022    @ [facility]. EF 55%. LA- 3.9 Cm. Mild-Mod MR & AI. RVSP- 31 mmHg    OTHER SURGICAL HISTORY  02/26/2016    CT head:  no old infarct seen    OTHER SURGICAL HISTORY  09/06/2016    Event monitor- PAF, Xarelto added and referral made to Dr. Rees       Current Outpatient Medications   Medication Sig Dispense Refill    albuterol (PROVENTIL HFA;VENTOLIN HFA) 108 (90 BASE) MCG/ACT inhaler Inhale 2 puffs Every 4 (Four) Hours As Needed for Wheezing.      aspirin 81 MG tablet Take 1 tablet by mouth Daily.      ferrous sulfate 325 (65 FE) MG tablet Take 1 tablet by mouth Daily With Breakfast.      polyethylene glycol (MIRALAX) packet Take 17 g by mouth Daily As Needed.      simvastatin (ZOCOR) 40 MG tablet Take 1 tablet by mouth Every Night.      metoprolol succinate XL (TOPROL-XL) 25 MG 24 hr tablet Take 1 tablet by mouth Every Night. 90 tablet 3     No current facility-administered medications for  this visit.       Patient has no known allergies.    Past Medical History:   Diagnosis Date    A-fib     CHADS-VASc = 0    Anxiety and depression     Arthritis     GERD (gastroesophageal reflux disease)     History of prostate surgery     Hyperlipidemia     Sleep apnea     CPAP    Vitamin D deficiency        Social History     Socioeconomic History    Marital status:     Number of children: 2   Tobacco Use    Smoking status: Former     Current packs/day: 0.00     Average packs/day: 3.0 packs/day for 20.0 years (60.0 ttl pk-yrs)     Types: Cigarettes     Start date:      Quit date:      Years since quittin.9     Passive exposure: Past    Smokeless tobacco: Never   Vaping Use    Vaping status: Never Used   Substance and Sexual Activity    Alcohol use: Yes     Comment: rarely drinks beer    Drug use: No    Sexual activity: Defer       Family History   Problem Relation Age of Onset    Hypertension Mother     Heart attack Father         passed away age 69    Heart attack Maternal Grandfather     Heart attack Paternal Grandmother        Review of Systems   Constitutional: Negative for malaise/fatigue and night sweats.   Cardiovascular:  Negative for chest pain, claudication, dyspnea on exertion, irregular heartbeat, leg swelling, near-syncope, orthopnea, palpitations and syncope.   Respiratory:  Positive for shortness of breath. Negative for cough and wheezing.    Musculoskeletal:  Positive for stiffness. Negative for back pain and joint pain.   Gastrointestinal:  Negative for anorexia, heartburn, nausea and vomiting.   Genitourinary:  Negative for dysuria, hematuria, hesitancy and nocturia.   Neurological:  Negative for dizziness, light-headedness and loss of balance.   Psychiatric/Behavioral:  Negative for depression and memory loss. The patient is not nervous/anxious.            Objective     /75 (BP Location: Right arm, Patient Position: Sitting, Cuff Size: Adult)   Pulse 58   Ht 182.9 cm  "(72.01\")   Wt 77.1 kg (170 lb)   BMI 23.05 kg/m²     Constitutional:       Appearance: Not in distress.   Eyes:      Pupils: Pupils are equal, round, and reactive to light.   HENT:      Nose: Nose normal.   Pulmonary:      Effort: Pulmonary effort is normal.      Breath sounds: Normal breath sounds.   Cardiovascular:      PMI at left midclavicular line. Bradycardia present. Regular rhythm.      Murmurs: There is a systolic murmur.   Abdominal:      Palpations: Abdomen is soft.   Musculoskeletal: Normal range of motion.      Cervical back: Normal range of motion and neck supple. Skin:     General: Skin is warm and dry.   Neurological:      Mental Status: Alert.           ECG 12 Lead    Date/Time: 12/10/2024 3:59 PM  Performed by: Phoebe Ramirez APRN    Authorized by: Phoebe Ramirez APRN  Comparison: compared with previous ECG from 8/15/2019  Similar to previous ECG  Rhythm: sinus bradycardia  BPM: 58    Clinical impression: abnormal EKG  Comments: Normal QT               Diagnoses and all orders for this visit:    1. PAF (paroxysmal atrial fibrillation) (Primary)  -     Stress Test With Myocardial Perfusion One Day; Future  -     Adult Transthoracic Echo Complete W/ Cont if Necessary Per Protocol; Future  -     CBC (No Diff); Future  -     Comprehensive Metabolic Panel; Future  -     TSH; Future  -     Magnesium; Future  -     ECG 12 Lead    2. SVT (supraventricular tachycardia)  -     Stress Test With Myocardial Perfusion One Day; Future  -     Adult Transthoracic Echo Complete W/ Cont if Necessary Per Protocol; Future  -     CBC (No Diff); Future  -     Comprehensive Metabolic Panel; Future  -     TSH; Future  -     Magnesium; Future    3. Shortness of breath  -     Stress Test With Myocardial Perfusion One Day; Future  -     Adult Transthoracic Echo Complete W/ Cont if Necessary Per Protocol; Future    4. Anginal equivalent  -     Stress Test With Myocardial Perfusion One Day; Future  -     Adult " Transthoracic Echo Complete W/ Cont if Necessary Per Protocol; Future    5. Mixed hyperlipidemia  -     Lipid Panel; Future    6. Primary hypertension  -     CBC (No Diff); Future  -     Comprehensive Metabolic Panel; Future  -     TSH; Future    7. Bradycardia, sinus  -     ECG 12 Lead    Other orders  -     Discontinue: losartan (Cozaar) 25 MG tablet; Take 1 tablet by mouth Daily.  Dispense: 90 tablet; Refill: 2  -     metoprolol succinate XL (TOPROL-XL) 25 MG 24 hr tablet; Take 1 tablet by mouth Every Night.  Dispense: 90 tablet; Refill: 3             PAF: Ablation in 2016. Has been maintained with BB therapy for rate control and ASA alone as 30 day monitor post ablation showed no runs of PAF. He denies palpitations. EKG done today showed a sinus rajiv with normal QT.     HTN: BP is now stable, elevated after he was without BB therapy. Since restart BP at goal. Will continue BB at same, but change to toprol 25mg daily. Limited sodium urged.     Cardiac status: SOA with activity noted. Workup has not been done for 5 years, repeat stress and echo urged to assess for ischemia, LV dysfunction, valve concerns. More recommendations to follow. ASA continued.    Hyperlipidemia: Taking zocor. No FLP available. Order provided with more recommendations to follow. Limited carb diet urged.    Refills per request.    BMI noted at 23.05, good cardiac diet urged.    6 month follow up recommended, sooner if needed.      Problems Addressed this Visit          Cardiac and Vasculature    PAF (paroxysmal atrial fibrillation) - Primary    Relevant Medications    metoprolol succinate XL (TOPROL-XL) 25 MG 24 hr tablet    Other Relevant Orders    Stress Test With Myocardial Perfusion One Day    Adult Transthoracic Echo Complete W/ Cont if Necessary Per Protocol    CBC (No Diff)    Comprehensive Metabolic Panel    TSH    Magnesium    ECG 12 Lead    SVT (supraventricular tachycardia)    Relevant Medications    metoprolol succinate XL  (TOPROL-XL) 25 MG 24 hr tablet    Other Relevant Orders    Stress Test With Myocardial Perfusion One Day    Adult Transthoracic Echo Complete W/ Cont if Necessary Per Protocol    CBC (No Diff)    Comprehensive Metabolic Panel    TSH    Magnesium    Mixed hyperlipidemia    Relevant Orders    Lipid Panel     Other Visit Diagnoses       Shortness of breath        Relevant Orders    Stress Test With Myocardial Perfusion One Day    Adult Transthoracic Echo Complete W/ Cont if Necessary Per Protocol    Anginal equivalent        Relevant Medications    metoprolol succinate XL (TOPROL-XL) 25 MG 24 hr tablet    Other Relevant Orders    Stress Test With Myocardial Perfusion One Day    Adult Transthoracic Echo Complete W/ Cont if Necessary Per Protocol    Primary hypertension        Relevant Medications    metoprolol succinate XL (TOPROL-XL) 25 MG 24 hr tablet    Other Relevant Orders    CBC (No Diff)    Comprehensive Metabolic Panel    TSH    Bradycardia, sinus        Relevant Medications    metoprolol succinate XL (TOPROL-XL) 25 MG 24 hr tablet    Other Relevant Orders    ECG 12 Lead          Diagnoses         Codes Comments    PAF (paroxysmal atrial fibrillation)    -  Primary ICD-10-CM: I48.0  ICD-9-CM: 427.31     SVT (supraventricular tachycardia)     ICD-10-CM: I47.10  ICD-9-CM: 427.89     Shortness of breath     ICD-10-CM: R06.02  ICD-9-CM: 786.05     Anginal equivalent     ICD-10-CM: I20.89  ICD-9-CM: 413.9     Mixed hyperlipidemia     ICD-10-CM: E78.2  ICD-9-CM: 272.2     Primary hypertension     ICD-10-CM: I10  ICD-9-CM: 401.9     Bradycardia, sinus     ICD-10-CM: R00.1  ICD-9-CM: 427.89             BMI is within normal parameters. No other follow-up for BMI required.                Electronically signed by Phoebe Ramirez, APRN December 10, 2024 16:10 EST

## 2025-01-15 ENCOUNTER — HOSPITAL ENCOUNTER (OUTPATIENT)
Dept: CARDIOLOGY | Facility: HOSPITAL | Age: 70
Discharge: HOME | End: 2025-01-15
Payer: MEDICARE

## 2025-01-15 ENCOUNTER — LAB (OUTPATIENT)
Dept: LAB | Facility: HOSPITAL | Age: 70
End: 2025-01-15
Payer: MEDICARE

## 2025-01-15 VITALS — BODY MASS INDEX: 23.02 KG/M2 | HEIGHT: 72 IN | WEIGHT: 169.97 LBS

## 2025-01-15 DIAGNOSIS — I48.0 PAF (PAROXYSMAL ATRIAL FIBRILLATION): ICD-10-CM

## 2025-01-15 DIAGNOSIS — E78.2 MIXED HYPERLIPIDEMIA: ICD-10-CM

## 2025-01-15 DIAGNOSIS — R06.02 SHORTNESS OF BREATH: ICD-10-CM

## 2025-01-15 DIAGNOSIS — I47.10 SVT (SUPRAVENTRICULAR TACHYCARDIA): ICD-10-CM

## 2025-01-15 DIAGNOSIS — I20.89 ANGINAL EQUIVALENT: ICD-10-CM

## 2025-01-15 DIAGNOSIS — I10 PRIMARY HYPERTENSION: ICD-10-CM

## 2025-01-15 LAB
ALBUMIN SERPL-MCNC: 4.4 G/DL (ref 3.5–5.2)
ALBUMIN/GLOB SERPL: 1.2 G/DL
ALP SERPL-CCNC: 123 U/L (ref 39–117)
ALT SERPL W P-5'-P-CCNC: 13 U/L (ref 1–41)
ANION GAP SERPL CALCULATED.3IONS-SCNC: 11.9 MMOL/L (ref 5–15)
AORTIC DIMENSIONLESS INDEX: 0.71 (DI)
AST SERPL-CCNC: 15 U/L (ref 1–40)
AV MEAN PRESS GRAD SYS DOP V1V2: 3.5 MMHG
AV VMAX SYS DOP: 126.5 CM/SEC
BH CV ECHO MEAS - ACS: 1.55 CM
BH CV ECHO MEAS - AO MAX PG: 6.4 MMHG
BH CV ECHO MEAS - AO ROOT DIAM: 3.1 CM
BH CV ECHO MEAS - AO V2 VTI: 24.3 CM
BH CV ECHO MEAS - EDV(CUBED): 92.9 ML
BH CV ECHO MEAS - ESV(CUBED): 32.5 ML
BH CV ECHO MEAS - FS: 29.6 %
BH CV ECHO MEAS - IVS/LVPW: 0.99 CM
BH CV ECHO MEAS - IVSD: 1.12 CM
BH CV ECHO MEAS - LA DIMENSION: 4.2 CM
BH CV ECHO MEAS - LAT PEAK E' VEL: 14.2 CM/SEC
BH CV ECHO MEAS - LV MASS(C)D: 182.8 GRAMS
BH CV ECHO MEAS - LV MAX PG: 2.29 MMHG
BH CV ECHO MEAS - LV MEAN PG: 1.23 MMHG
BH CV ECHO MEAS - LV V1 MAX: 75.6 CM/SEC
BH CV ECHO MEAS - LV V1 VTI: 17.4 CM
BH CV ECHO MEAS - LVIDD: 4.5 CM
BH CV ECHO MEAS - LVIDS: 3.2 CM
BH CV ECHO MEAS - LVPWD: 1.13 CM
BH CV ECHO MEAS - MED PEAK E' VEL: 10.2 CM/SEC
BH CV ECHO MEAS - MV DEC SLOPE: 346.5 CM/SEC2
BH CV ECHO MEAS - MV DEC TIME: 0.24 SEC
BH CV ECHO MEAS - MV E MAX VEL: 74.4 CM/SEC
BH CV ECHO MEAS - MV MAX PG: 2.8 MMHG
BH CV ECHO MEAS - MV MEAN PG: 1.17 MMHG
BH CV ECHO MEAS - MV P1/2T: 87.4 MSEC
BH CV ECHO MEAS - MV V2 VTI: 21.1 CM
BH CV ECHO MEAS - MVA(P1/2T): 2.5 CM2
BH CV ECHO MEAS - PA V2 MAX: 78.2 CM/SEC
BH CV ECHO MEAS - RAP SYSTOLE: 8 MMHG
BH CV ECHO MEAS - RV MAX PG: 1.5 MMHG
BH CV ECHO MEAS - RV V1 MAX: 61.3 CM/SEC
BH CV ECHO MEAS - RV V1 VTI: 12.8 CM
BH CV ECHO MEAS - RVDD: 3.7 CM
BH CV ECHO MEAS - RVSP: 24.9 MMHG
BH CV ECHO MEAS - TAPSE (>1.6): 2.36 CM
BH CV ECHO MEAS - TR MAX PG: 16.9 MMHG
BH CV ECHO MEAS - TR MAX VEL: 205.8 CM/SEC
BH CV ECHO MEASUREMENTS AVERAGE E/E' RATIO: 6.1
BH CV REST NUCLEAR ISOTOPE DOSE: 10 MCI
BH CV STRESS NUCLEAR ISOTOPE DOSE: 30 MCI
BH CV STRESS RECOVERY BP: NORMAL MMHG
BH CV STRESS RECOVERY HR: 95 BPM
BH CV XLRA - TDI S': 11 CM/SEC
BILIRUB SERPL-MCNC: 0.5 MG/DL (ref 0–1.2)
BUN SERPL-MCNC: 19 MG/DL (ref 8–23)
BUN/CREAT SERPL: 14.6 (ref 7–25)
CALCIUM SPEC-SCNC: 9.6 MG/DL (ref 8.6–10.5)
CHLORIDE SERPL-SCNC: 97 MMOL/L (ref 98–107)
CHOLEST SERPL-MCNC: 258 MG/DL (ref 0–200)
CO2 SERPL-SCNC: 28.1 MMOL/L (ref 22–29)
CREAT SERPL-MCNC: 1.3 MG/DL (ref 0.76–1.27)
DEPRECATED RDW RBC AUTO: 46.8 FL (ref 37–54)
EGFRCR SERPLBLD CKD-EPI 2021: 59.5 ML/MIN/1.73
ERYTHROCYTE [DISTWIDTH] IN BLOOD BY AUTOMATED COUNT: 14.1 % (ref 12.3–15.4)
GLOBULIN UR ELPH-MCNC: 3.6 GM/DL
GLUCOSE SERPL-MCNC: 86 MG/DL (ref 65–99)
HCT VFR BLD AUTO: 49.5 % (ref 37.5–51)
HDLC SERPL-MCNC: 49 MG/DL (ref 40–60)
HGB BLD-MCNC: 15.7 G/DL (ref 13–17.7)
LDLC SERPL CALC-MCNC: 168 MG/DL (ref 0–100)
LDLC/HDLC SERPL: 3.37 {RATIO}
LV EF 3D SEGMENTATION: 59 %
MAGNESIUM SERPL-MCNC: 2.3 MG/DL (ref 1.6–2.4)
MAXIMAL PREDICTED HEART RATE: 151 BPM
MCH RBC QN AUTO: 28.9 PG (ref 26.6–33)
MCHC RBC AUTO-ENTMCNC: 31.7 G/DL (ref 31.5–35.7)
MCV RBC AUTO: 91.2 FL (ref 79–97)
PERCENT MAX PREDICTED HR: 88.74 %
PLATELET # BLD AUTO: 352 10*3/MM3 (ref 140–450)
PMV BLD AUTO: 10.2 FL (ref 6–12)
POTASSIUM SERPL-SCNC: 3.7 MMOL/L (ref 3.5–5.2)
PROT SERPL-MCNC: 8 G/DL (ref 6–8.5)
RBC # BLD AUTO: 5.43 10*6/MM3 (ref 4.14–5.8)
SINUS: 3.2 CM
SODIUM SERPL-SCNC: 137 MMOL/L (ref 136–145)
SPECT HRT GATED+EF W RNC IV: 64 %
STRESS BASELINE BP: NORMAL MMHG
STRESS BASELINE HR: 85 BPM
STRESS PERCENT HR: 104 %
STRESS POST ESTIMATED WORKLOAD: 7 METS
STRESS POST EXERCISE DUR MIN: 4 MIN
STRESS POST EXERCISE DUR SEC: 52 SEC
STRESS POST PEAK BP: NORMAL MMHG
STRESS POST PEAK HR: 134 BPM
STRESS TARGET HR: 128 BPM
TRIGL SERPL-MCNC: 219 MG/DL (ref 0–150)
TSH SERPL DL<=0.05 MIU/L-ACNC: 4.74 UIU/ML (ref 0.27–4.2)
VLDLC SERPL-MCNC: 41 MG/DL (ref 5–40)
WBC NRBC COR # BLD AUTO: 6.81 10*3/MM3 (ref 3.4–10.8)

## 2025-01-15 PROCEDURE — 93306 TTE W/DOPPLER COMPLETE: CPT

## 2025-01-15 PROCEDURE — 83735 ASSAY OF MAGNESIUM: CPT

## 2025-01-15 PROCEDURE — 78452 HT MUSCLE IMAGE SPECT MULT: CPT

## 2025-01-15 PROCEDURE — 36415 COLL VENOUS BLD VENIPUNCTURE: CPT

## 2025-01-15 PROCEDURE — 34310000005 TECHNETIUM SESTAMIBI: Performed by: INTERNAL MEDICINE

## 2025-01-15 PROCEDURE — 80061 LIPID PANEL: CPT

## 2025-01-15 PROCEDURE — 85027 COMPLETE CBC AUTOMATED: CPT

## 2025-01-15 PROCEDURE — A9500 TC99M SESTAMIBI: HCPCS | Performed by: INTERNAL MEDICINE

## 2025-01-15 PROCEDURE — 84443 ASSAY THYROID STIM HORMONE: CPT

## 2025-01-15 PROCEDURE — 80053 COMPREHEN METABOLIC PANEL: CPT

## 2025-01-15 PROCEDURE — 93017 CV STRESS TEST TRACING ONLY: CPT

## 2025-01-15 RX ADMIN — TECHNETIUM TC 99M SESTAMIBI 1 DOSE: 1 INJECTION INTRAVENOUS at 08:35

## 2025-01-15 RX ADMIN — TECHNETIUM TC 99M SESTAMIBI 1 DOSE: 1 INJECTION INTRAVENOUS at 09:36
